# Patient Record
Sex: MALE | Race: WHITE | NOT HISPANIC OR LATINO | Employment: FULL TIME | URBAN - METROPOLITAN AREA
[De-identification: names, ages, dates, MRNs, and addresses within clinical notes are randomized per-mention and may not be internally consistent; named-entity substitution may affect disease eponyms.]

---

## 2013-03-13 LAB — HCV AB SER-ACNC: NEGATIVE

## 2021-09-22 PROBLEM — G43.909 MIGRAINE WITHOUT STATUS MIGRAINOSUS, NOT INTRACTABLE: Status: ACTIVE | Noted: 2021-09-22

## 2021-09-22 PROBLEM — J30.9 ALLERGIC RHINITIS: Status: ACTIVE | Noted: 2021-09-22

## 2022-11-07 ENCOUNTER — OFFICE VISIT (OUTPATIENT)
Dept: FAMILY MEDICINE | Facility: CLINIC | Age: 57
End: 2022-11-07
Payer: COMMERCIAL

## 2022-11-07 VITALS
HEIGHT: 72 IN | OXYGEN SATURATION: 98 % | BODY MASS INDEX: 34.95 KG/M2 | RESPIRATION RATE: 18 BRPM | HEART RATE: 72 BPM | WEIGHT: 258 LBS | SYSTOLIC BLOOD PRESSURE: 136 MMHG | DIASTOLIC BLOOD PRESSURE: 82 MMHG

## 2022-11-07 DIAGNOSIS — H40.9 GLAUCOMA OF BOTH EYES, UNSPECIFIED GLAUCOMA TYPE: ICD-10-CM

## 2022-11-07 DIAGNOSIS — D64.9 ANEMIA, UNSPECIFIED TYPE: ICD-10-CM

## 2022-11-07 DIAGNOSIS — E66.9 OBESITY (BMI 35.0-39.9 WITHOUT COMORBIDITY): ICD-10-CM

## 2022-11-07 DIAGNOSIS — E03.9 HYPOTHYROIDISM, UNSPECIFIED TYPE: ICD-10-CM

## 2022-11-07 DIAGNOSIS — G43.009 MIGRAINE WITHOUT AURA AND WITHOUT STATUS MIGRAINOSUS, NOT INTRACTABLE: Primary | ICD-10-CM

## 2022-11-07 DIAGNOSIS — E78.00 HYPERCHOLESTEROLEMIA: ICD-10-CM

## 2022-11-07 DIAGNOSIS — Z79.899 ENCOUNTER FOR LONG-TERM (CURRENT) USE OF OTHER MEDICATIONS: ICD-10-CM

## 2022-11-07 LAB
ALBUMIN SERPL BCP-MCNC: 3.7 G/DL (ref 3.5–5.2)
ALP SERPL-CCNC: 95 U/L (ref 55–135)
ALT SERPL W/O P-5'-P-CCNC: 20 U/L (ref 10–44)
ANION GAP SERPL CALC-SCNC: 11 MMOL/L (ref 8–16)
AST SERPL-CCNC: 18 U/L (ref 10–40)
BASOPHILS # BLD AUTO: 0.07 K/UL (ref 0–0.2)
BASOPHILS NFR BLD: 1.2 % (ref 0–1.9)
BILIRUB SERPL-MCNC: 0.2 MG/DL (ref 0.1–1)
BUN SERPL-MCNC: 13 MG/DL (ref 6–20)
CALCIUM SERPL-MCNC: 9.2 MG/DL (ref 8.7–10.5)
CHLORIDE SERPL-SCNC: 108 MMOL/L (ref 95–110)
CHOLEST SERPL-MCNC: 175 MG/DL (ref 120–199)
CHOLEST/HDLC SERPL: 4.3 {RATIO} (ref 2–5)
CO2 SERPL-SCNC: 21 MMOL/L (ref 23–29)
CREAT SERPL-MCNC: 1 MG/DL (ref 0.5–1.4)
DIFFERENTIAL METHOD: NORMAL
EOSINOPHIL # BLD AUTO: 0.3 K/UL (ref 0–0.5)
EOSINOPHIL NFR BLD: 4.8 % (ref 0–8)
ERYTHROCYTE [DISTWIDTH] IN BLOOD BY AUTOMATED COUNT: 13.1 % (ref 11.5–14.5)
EST. GFR  (NO RACE VARIABLE): >60 ML/MIN/1.73 M^2
GLUCOSE SERPL-MCNC: 110 MG/DL (ref 70–110)
HCT VFR BLD AUTO: 46.8 % (ref 40–54)
HDLC SERPL-MCNC: 41 MG/DL (ref 40–75)
HDLC SERPL: 23.4 % (ref 20–50)
HGB BLD-MCNC: 15 G/DL (ref 14–18)
IMM GRANULOCYTES # BLD AUTO: 0.01 K/UL (ref 0–0.04)
IMM GRANULOCYTES NFR BLD AUTO: 0.2 % (ref 0–0.5)
LDLC SERPL CALC-MCNC: 112.8 MG/DL (ref 63–159)
LYMPHOCYTES # BLD AUTO: 1.5 K/UL (ref 1–4.8)
LYMPHOCYTES NFR BLD: 25.3 % (ref 18–48)
MCH RBC QN AUTO: 30.4 PG (ref 27–31)
MCHC RBC AUTO-ENTMCNC: 32.1 G/DL (ref 32–36)
MCV RBC AUTO: 95 FL (ref 82–98)
MONOCYTES # BLD AUTO: 0.6 K/UL (ref 0.3–1)
MONOCYTES NFR BLD: 10.7 % (ref 4–15)
NEUTROPHILS # BLD AUTO: 3.4 K/UL (ref 1.8–7.7)
NEUTROPHILS NFR BLD: 57.8 % (ref 38–73)
NONHDLC SERPL-MCNC: 134 MG/DL
NRBC BLD-RTO: 0 /100 WBC
PLATELET # BLD AUTO: 204 K/UL (ref 150–450)
PMV BLD AUTO: 10.6 FL (ref 9.2–12.9)
POTASSIUM SERPL-SCNC: 4.1 MMOL/L (ref 3.5–5.1)
PROT SERPL-MCNC: 6.7 G/DL (ref 6–8.4)
RBC # BLD AUTO: 4.93 M/UL (ref 4.6–6.2)
SODIUM SERPL-SCNC: 140 MMOL/L (ref 136–145)
TRIGL SERPL-MCNC: 106 MG/DL (ref 30–150)
TSH SERPL DL<=0.005 MIU/L-ACNC: 1.68 UIU/ML (ref 0.4–4)
WBC # BLD AUTO: 5.81 K/UL (ref 3.9–12.7)

## 2022-11-07 PROCEDURE — 3075F PR MOST RECENT SYSTOLIC BLOOD PRESS GE 130-139MM HG: ICD-10-PCS | Mod: S$GLB,,, | Performed by: INTERNAL MEDICINE

## 2022-11-07 PROCEDURE — 1159F MED LIST DOCD IN RCRD: CPT | Mod: S$GLB,,, | Performed by: INTERNAL MEDICINE

## 2022-11-07 PROCEDURE — 85025 COMPLETE CBC W/AUTO DIFF WBC: CPT | Performed by: INTERNAL MEDICINE

## 2022-11-07 PROCEDURE — 3008F PR BODY MASS INDEX (BMI) DOCUMENTED: ICD-10-PCS | Mod: S$GLB,,, | Performed by: INTERNAL MEDICINE

## 2022-11-07 PROCEDURE — 3008F BODY MASS INDEX DOCD: CPT | Mod: S$GLB,,, | Performed by: INTERNAL MEDICINE

## 2022-11-07 PROCEDURE — 3079F PR MOST RECENT DIASTOLIC BLOOD PRESSURE 80-89 MM HG: ICD-10-PCS | Mod: S$GLB,,, | Performed by: INTERNAL MEDICINE

## 2022-11-07 PROCEDURE — 99204 OFFICE O/P NEW MOD 45 MIN: CPT | Mod: S$GLB,,, | Performed by: INTERNAL MEDICINE

## 2022-11-07 PROCEDURE — 3079F DIAST BP 80-89 MM HG: CPT | Mod: S$GLB,,, | Performed by: INTERNAL MEDICINE

## 2022-11-07 PROCEDURE — 3075F SYST BP GE 130 - 139MM HG: CPT | Mod: S$GLB,,, | Performed by: INTERNAL MEDICINE

## 2022-11-07 PROCEDURE — 80053 COMPREHEN METABOLIC PANEL: CPT | Performed by: INTERNAL MEDICINE

## 2022-11-07 PROCEDURE — 1159F PR MEDICATION LIST DOCUMENTED IN MEDICAL RECORD: ICD-10-PCS | Mod: S$GLB,,, | Performed by: INTERNAL MEDICINE

## 2022-11-07 PROCEDURE — 99204 PR OFFICE/OUTPT VISIT, NEW, LEVL IV, 45-59 MIN: ICD-10-PCS | Mod: S$GLB,,, | Performed by: INTERNAL MEDICINE

## 2022-11-07 PROCEDURE — 80061 LIPID PANEL: CPT | Performed by: INTERNAL MEDICINE

## 2022-11-07 PROCEDURE — 84443 ASSAY THYROID STIM HORMONE: CPT | Performed by: INTERNAL MEDICINE

## 2022-11-07 RX ORDER — RIMEGEPANT SULFATE 75 MG/75MG
TABLET, ORALLY DISINTEGRATING ORAL
Qty: 16 TABLET | Refills: 2 | Status: SHIPPED | OUTPATIENT
Start: 2022-11-07

## 2022-11-07 RX ORDER — PROMETHAZINE HYDROCHLORIDE 25 MG/1
TABLET ORAL
Qty: 15 TABLET | Refills: 1 | Status: SHIPPED | OUTPATIENT
Start: 2022-11-07 | End: 2022-12-22 | Stop reason: SDUPTHER

## 2022-11-07 RX ORDER — TRIAMCINOLONE ACETONIDE 0.25 MG/G
CREAM TOPICAL
COMMUNITY

## 2022-11-07 RX ORDER — DIPHENHYDRAMINE HCL 25 MG
25 TABLET ORAL NIGHTLY PRN
COMMUNITY

## 2022-11-07 RX ORDER — DIPHENOXYLATE HYDROCHLORIDE AND ATROPINE SULFATE 2.5; .025 MG/1; MG/1
TABLET ORAL
COMMUNITY

## 2022-11-07 NOTE — Clinical Note
Had Tdap vaccination in the last 4 years in [place], [place] at [facility]  Had hepatitis-C screening in [facility] by Dr. Lyndsey Campuzano  PCP

## 2022-11-07 NOTE — PROGRESS NOTES
Subjective:       Patient ID: Jason Swartz is a 56 y.o. male.    Chief Complaint: Establish Care    Patient presents to the office today to establish care.      With regards to the care gaps we have cover so far that he has had hepatitis-C screening when he was living in [facility] approximately 9 years ago and will get that record to update that care gap.  Regarding the Tdap his suffer an injury in the last 4 years on had a Tdap administered at [facility] in [place], I will get the clinical care coordinator to get that information to update this care gap.      So far he has not had a shingles vaccine on had a reaction to a flu shot so he will not be receiving 1.  Regarding the COVID vaccination is had 2 doses +1 booster as of now.    Main Reason 1st ablation care is the fact that he needs follow-up with regards to his migraine management.  I will refill his prescription of the Nurtec that he can take 1 every day as needed with a prescription for 16 with 2 refills to [facility] pharmacy.  The prescription for the promethazine will be issued to the local pharmacy so he can have it available in case he has severe migraine associated with nausea.    Last wellness examination was carried out September of 2021 so he is due 1 now and will have the lab work drawn today and arrange for the actual visit in the next 14 days as per routine.      Patient has been having his blood pressure checked at home with a digital apparatus and has been seen some elevation in the numbers.  He actually has a record at home.  In to bring his device to compare it against ours at the time of his wellness examination in the next 2 weeks.  As of today with our properly calibrated equipment and well trained personnel and proper size cuff is blood pressure 136/82 which we are not going to be hard pressed to make a diagnosis of hypertension based on that.      Because of a prior injury patient has been experiencing some neck pain  for which he used to take Botox injections.  On for some without have anyone the provides that can of service in town.  He has been resorting to the use of methocarbamol as before.  Will recommend against the use of traction at this time.        Review of Systems   All other systems reviewed and are negative.      Objective:      Physical Exam  Vitals and nursing note reviewed.   Constitutional:       General: He is not in acute distress.     Appearance: Normal appearance. He is obese. He is not ill-appearing, toxic-appearing or diaphoretic.   HENT:      Head: Normocephalic and atraumatic.   Cardiovascular:      Rate and Rhythm: Normal rate and regular rhythm.      Pulses: Normal pulses.      Heart sounds: Normal heart sounds. No murmur heard.  Pulmonary:      Effort: Pulmonary effort is normal.      Breath sounds: Normal breath sounds.   Skin:     General: Skin is warm and dry.      Coloration: Skin is not jaundiced or pale.   Neurological:      General: No focal deficit present.      Mental Status: He is alert and oriented to person, place, and time. Mental status is at baseline.      Cranial Nerves: No cranial nerve deficit.      Sensory: No sensory deficit.      Motor: No weakness.      Coordination: Coordination normal.      Gait: Gait normal.   Psychiatric:         Mood and Affect: Mood normal.         Behavior: Behavior normal.         Thought Content: Thought content normal.         Judgment: Judgment normal.       Assessment:       Problem List Items Addressed This Visit          Neuro    Migraine without status migrainosus, not intractable - Primary     Other Visit Diagnoses       Glaucoma of both eyes, unspecified glaucoma type        Anemia, unspecified type        Encounter for long-term (current) use of other medications        Hypothyroidism, unspecified type        Hypercholesterolemia        Obesity (BMI 35.0-39.9 without comorbidity)                  Plan:       Patient returns today to get  re-established under my care.      Care gaps will be addressed more in detail at the time of the wellness exam which will take place in the next 2 weeks.      Wellness examination lab work has been obtained today and patient is in a fasting state.  This included CBC, comprehensive metabolic panel, lipid panel, TSH and hemoglobin A1c    He prefers not to have the seasonal influenza vaccination being that he had a localized reaction before and that will be included on the allergies starting today.      I have requested for the clinical care coordinator to obtain information regarding his Tdap vaccination history and hepatitis-C screening.    He can have a shingles vaccination at his convenience.      He has had 2 doses of the COVID vaccine +1 booster.      Prescription for the Nurtec 1 every day as needed 16. With 2 refills will be issued to the mail order pharmacy and will contact him with regards to establishing a relationship.    Referral to neurology has been issued to Dr. Minor and will get a call from his office with regards to final scheduling details.

## 2022-11-08 ENCOUNTER — PATIENT MESSAGE (OUTPATIENT)
Dept: ADMINISTRATIVE | Facility: HOSPITAL | Age: 57
End: 2022-11-08
Payer: COMMERCIAL

## 2022-11-08 ENCOUNTER — PATIENT OUTREACH (OUTPATIENT)
Dept: ADMINISTRATIVE | Facility: HOSPITAL | Age: 57
End: 2022-11-08
Payer: COMMERCIAL

## 2022-11-08 NOTE — PROGRESS NOTES
PORTAL MESSAGE SENT TO PATIENT TO FIND OUT WHERE TDAP AND COLONOSCOPY (IF DONE) WERE DONE.  HEP C HYPERLINKED FROM CARE EVERYWHERE

## 2022-12-20 ENCOUNTER — OFFICE VISIT (OUTPATIENT)
Dept: URGENT CARE | Facility: CLINIC | Age: 57
End: 2022-12-20
Payer: COMMERCIAL

## 2022-12-20 VITALS
WEIGHT: 258 LBS | TEMPERATURE: 99 F | DIASTOLIC BLOOD PRESSURE: 93 MMHG | BODY MASS INDEX: 34.99 KG/M2 | SYSTOLIC BLOOD PRESSURE: 142 MMHG | OXYGEN SATURATION: 97 % | HEART RATE: 85 BPM | RESPIRATION RATE: 16 BRPM

## 2022-12-20 DIAGNOSIS — G43.901 MIGRAINE WITH STATUS MIGRAINOSUS, NOT INTRACTABLE, UNSPECIFIED MIGRAINE TYPE: Primary | ICD-10-CM

## 2022-12-20 DIAGNOSIS — R21 RASH AND NONSPECIFIC SKIN ERUPTION: ICD-10-CM

## 2022-12-20 DIAGNOSIS — B02.8 HERPES ZOSTER WITH COMPLICATION: ICD-10-CM

## 2022-12-20 PROCEDURE — 1159F MED LIST DOCD IN RCRD: CPT | Mod: S$GLB,,, | Performed by: STUDENT IN AN ORGANIZED HEALTH CARE EDUCATION/TRAINING PROGRAM

## 2022-12-20 PROCEDURE — 3008F PR BODY MASS INDEX (BMI) DOCUMENTED: ICD-10-PCS | Mod: S$GLB,,, | Performed by: STUDENT IN AN ORGANIZED HEALTH CARE EDUCATION/TRAINING PROGRAM

## 2022-12-20 PROCEDURE — 96372 PR INJECTION,THERAP/PROPH/DIAG2ST, IM OR SUBCUT: ICD-10-PCS | Mod: S$GLB,,, | Performed by: STUDENT IN AN ORGANIZED HEALTH CARE EDUCATION/TRAINING PROGRAM

## 2022-12-20 PROCEDURE — 96372 THER/PROPH/DIAG INJ SC/IM: CPT | Mod: S$GLB,,, | Performed by: STUDENT IN AN ORGANIZED HEALTH CARE EDUCATION/TRAINING PROGRAM

## 2022-12-20 PROCEDURE — 1159F PR MEDICATION LIST DOCUMENTED IN MEDICAL RECORD: ICD-10-PCS | Mod: S$GLB,,, | Performed by: STUDENT IN AN ORGANIZED HEALTH CARE EDUCATION/TRAINING PROGRAM

## 2022-12-20 PROCEDURE — 3077F SYST BP >= 140 MM HG: CPT | Mod: S$GLB,,, | Performed by: STUDENT IN AN ORGANIZED HEALTH CARE EDUCATION/TRAINING PROGRAM

## 2022-12-20 PROCEDURE — 99204 OFFICE O/P NEW MOD 45 MIN: CPT | Mod: 25,S$GLB,, | Performed by: STUDENT IN AN ORGANIZED HEALTH CARE EDUCATION/TRAINING PROGRAM

## 2022-12-20 PROCEDURE — 99204 PR OFFICE/OUTPT VISIT, NEW, LEVL IV, 45-59 MIN: ICD-10-PCS | Mod: 25,S$GLB,, | Performed by: STUDENT IN AN ORGANIZED HEALTH CARE EDUCATION/TRAINING PROGRAM

## 2022-12-20 PROCEDURE — 1160F RVW MEDS BY RX/DR IN RCRD: CPT | Mod: S$GLB,,, | Performed by: STUDENT IN AN ORGANIZED HEALTH CARE EDUCATION/TRAINING PROGRAM

## 2022-12-20 PROCEDURE — 3080F DIAST BP >= 90 MM HG: CPT | Mod: S$GLB,,, | Performed by: STUDENT IN AN ORGANIZED HEALTH CARE EDUCATION/TRAINING PROGRAM

## 2022-12-20 PROCEDURE — 1160F PR REVIEW ALL MEDS BY PRESCRIBER/CLIN PHARMACIST DOCUMENTED: ICD-10-PCS | Mod: S$GLB,,, | Performed by: STUDENT IN AN ORGANIZED HEALTH CARE EDUCATION/TRAINING PROGRAM

## 2022-12-20 PROCEDURE — 3080F PR MOST RECENT DIASTOLIC BLOOD PRESSURE >= 90 MM HG: ICD-10-PCS | Mod: S$GLB,,, | Performed by: STUDENT IN AN ORGANIZED HEALTH CARE EDUCATION/TRAINING PROGRAM

## 2022-12-20 PROCEDURE — 3008F BODY MASS INDEX DOCD: CPT | Mod: S$GLB,,, | Performed by: STUDENT IN AN ORGANIZED HEALTH CARE EDUCATION/TRAINING PROGRAM

## 2022-12-20 PROCEDURE — 3077F PR MOST RECENT SYSTOLIC BLOOD PRESSURE >= 140 MM HG: ICD-10-PCS | Mod: S$GLB,,, | Performed by: STUDENT IN AN ORGANIZED HEALTH CARE EDUCATION/TRAINING PROGRAM

## 2022-12-20 RX ORDER — BUTALBITAL, ACETAMINOPHEN AND CAFFEINE 50; 325; 40 MG/1; MG/1; MG/1
1 TABLET ORAL EVERY 6 HOURS PRN
Qty: 15 TABLET | Refills: 0 | Status: SHIPPED | OUTPATIENT
Start: 2022-12-20

## 2022-12-20 RX ORDER — VALACYCLOVIR HYDROCHLORIDE 1 G/1
1000 TABLET, FILM COATED ORAL 3 TIMES DAILY
Qty: 21 TABLET | Refills: 0 | Status: SHIPPED | OUTPATIENT
Start: 2022-12-20 | End: 2022-12-27

## 2022-12-20 RX ORDER — KETOROLAC TROMETHAMINE 30 MG/ML
60 INJECTION, SOLUTION INTRAMUSCULAR; INTRAVENOUS
Status: DISCONTINUED | OUTPATIENT
Start: 2022-12-20 | End: 2022-12-20

## 2022-12-20 RX ORDER — DIPHENHYDRAMINE HYDROCHLORIDE 50 MG/ML
12.5 INJECTION INTRAMUSCULAR; INTRAVENOUS
Status: DISCONTINUED | OUTPATIENT
Start: 2022-12-20 | End: 2022-12-20

## 2022-12-20 RX ORDER — PROMETHAZINE HYDROCHLORIDE 25 MG/ML
25 INJECTION, SOLUTION INTRAMUSCULAR; INTRAVENOUS
Status: DISCONTINUED | OUTPATIENT
Start: 2022-12-20 | End: 2022-12-20

## 2022-12-20 RX ORDER — CEPHALEXIN 500 MG/1
500 CAPSULE ORAL EVERY 8 HOURS
Qty: 30 CAPSULE | Refills: 0 | Status: SHIPPED | OUTPATIENT
Start: 2022-12-20 | End: 2022-12-30

## 2022-12-20 RX ADMIN — PROMETHAZINE HYDROCHLORIDE 25 MG: 25 INJECTION, SOLUTION INTRAMUSCULAR; INTRAVENOUS at 04:12

## 2022-12-20 RX ADMIN — KETOROLAC TROMETHAMINE 60 MG: 30 INJECTION, SOLUTION INTRAMUSCULAR; INTRAVENOUS at 04:12

## 2022-12-20 RX ADMIN — DIPHENHYDRAMINE HYDROCHLORIDE 12.5 MG: 50 INJECTION INTRAMUSCULAR; INTRAVENOUS at 04:12

## 2022-12-20 NOTE — PROGRESS NOTES
oaSubjective:       Patient ID: Jason Swartz is a 57 y.o. male.    Vitals:  weight is 117 kg (258 lb). His oral temperature is 99 °F (37.2 °C). His blood pressure is 142/93 (abnormal) and his pulse is 85. His respiration is 16 and oxygen saturation is 97%.     Chief Complaint: Headache and Blister    Patient is a 57-year-old male with a past medical history of ADHD, skin cancer, pre glaucoma, and migraines who presents to clinic for evaluation of migraine and for rash.  Patient reports migraine times 9 days.  Patient states he takes prescribed Nurtec and sees Neurology for his headaches.  Patient states 90% of time his migraines are on the left side of his head however 10% on the right side of his head.  Patient states current episode is located to the right side of his head.  Patient describes pain to be throbbing type sensation.  Patient states symptoms feel identical to prior migraines.  Patient states that he has experienced associated symptoms of photophobia.  Patient states that he has also taking Phenergan to help him sleep.  Patient states no resolution of symptoms.  Patient states he was once on a medications something similar to Fioricet which did helps symptoms however he has been out for the past few months.  Patient states that he has an appointment with Neurology in a few weeks.  Patient states that he is also experienced a rash to the right side of the face.  Patient states it is along the nose on the inside portion of his right eye.  Patient states symptoms have not crossing to his eye however are close.  Patient states prior to rash she experienced an itching and burning type sensation.  Patient states he then experienced a blistering rash to the face.  Patient states that he tried to squeeze the area and drained the blisters however pain was increased.  Patient states that he has not used any over-the-counter creams for this at this point.  Patient states that he has not experienced any  dizziness, fever or chills, vision loss or change, inner eye complications, chest pain or palpitations, shortness of breath or cough, abdominal pain, nausea vomiting, diarrhea, or change in mentation.    Headache   This is a new problem. The current episode started in the past 7 days (sunday before last). The problem occurs constantly. The problem has been unchanged. The pain is located in the Temporal region. The pain quality is similar to prior headaches. The quality of the pain is described as aching, throbbing, stabbing and sharp. The pain is at a severity of 10/10. Associated symptoms include photophobia and a visual change. Pertinent negatives include no abdominal pain, blurred vision, coughing, dizziness, ear pain, eye pain, eye redness, fever, nausea, sore throat or vomiting. The treatment provided no relief. His past medical history is significant for migraine headaches.     Constitution: Negative. Negative for chills, sweating, fatigue and fever.   HENT:  Negative for ear pain, congestion and sore throat.    Neck: neck negative.   Cardiovascular: Negative.  Negative for chest pain and palpitations.   Eyes:  Positive for photophobia. Negative for eye discharge, eye itching, eye pain, eye redness, vision loss, double vision, blurred vision and eyelid swelling.   Respiratory:  Negative for chest tightness, cough and shortness of breath.    Gastrointestinal: Negative.  Negative for abdominal pain, nausea, vomiting and diarrhea.   Endocrine: negative.   Genitourinary: Negative.    Musculoskeletal: Negative.    Skin:  Positive for rash and erythema (Associated with rash).   Allergic/Immunologic: Positive for itching.   Neurological:  Positive for headaches and history of migraines. Negative for dizziness, light-headedness, passing out, disorientation and altered mental status.   Hematologic/Lymphatic: Negative.    Psychiatric/Behavioral: Negative.  Negative for altered mental status, disorientation and  confusion.      Objective:      Physical Exam   Constitutional: He is oriented to person, place, and time. He appears well-developed. He is cooperative.  Non-toxic appearance. He does not appear ill. No distress.   HENT:   Head: Normocephalic and atraumatic.       Ears:   Right Ear: Hearing, tympanic membrane, external ear and ear canal normal.   Left Ear: Hearing, tympanic membrane, external ear and ear canal normal.   Nose: Nose normal. No mucosal edema, rhinorrhea or nasal deformity. No epistaxis. Right sinus exhibits no maxillary sinus tenderness and no frontal sinus tenderness. Left sinus exhibits no maxillary sinus tenderness and no frontal sinus tenderness.   Mouth/Throat: Uvula is midline, oropharynx is clear and moist and mucous membranes are normal. No trismus in the jaw. Normal dentition. No uvula swelling. No posterior oropharyngeal erythema.   Eyes: Conjunctivae and lids are normal. Pupils are equal, round, and reactive to light. Right eye exhibits no discharge. Left eye exhibits no discharge. No scleral icterus.   Neck: Trachea normal and phonation normal. Neck supple. No neck rigidity present.   Cardiovascular: Normal rate, regular rhythm, normal heart sounds and normal pulses.   Pulmonary/Chest: Effort normal and breath sounds normal. No respiratory distress. He has no wheezes. He has no rhonchi. He has no rales.   Abdominal: Normal appearance and bowel sounds are normal. He exhibits no distension. Soft. There is no abdominal tenderness.   Musculoskeletal: Normal range of motion.         General: No deformity. Normal range of motion.      Cervical back: He exhibits no tenderness.   Lymphadenopathy:     He has no cervical adenopathy.   Neurological: He is alert and oriented to person, place, and time. He displays no weakness. No sensory deficit. He exhibits normal muscle tone. Coordination and gait normal.   Skin: Skin is warm, dry, intact, not diaphoretic, not pale, rash and vesicular. Capillary  refill takes 2 to 3 seconds. erythema (Associated with rash)   Psychiatric: His speech is normal and behavior is normal. Judgment and thought content normal.   Nursing note and vitals reviewed.      Assessment:       1. Migraine with status migrainosus, not intractable, unspecified migraine type    2. Herpes zoster with complication    3. Rash and nonspecific skin eruption          Plan:         Migraine with status migrainosus, not intractable, unspecified migraine type    Herpes zoster with complication    Rash and nonspecific skin eruption    Other orders  -     valACYclovir (VALTREX) 1000 MG tablet; Take 1 tablet (1,000 mg total) by mouth 3 (three) times daily. for 7 days  Dispense: 21 tablet; Refill: 0  -     cephALEXin (KEFLEX) 500 MG capsule; Take 1 capsule (500 mg total) by mouth every 8 (eight) hours. for 10 days  Dispense: 30 capsule; Refill: 0  -     butalbital-acetaminophen-caffeine -40 mg (FIORICET, ESGIC) -40 mg per tablet; Take 1 tablet by mouth every 6 (six) hours as needed for Headaches.  Dispense: 15 tablet; Refill: 0  -     ketorolac injection 60 mg  -     promethazine injection 25 mg  -     diphenhydrAMINE injection 12.5 mg                 Toradol 60 mg IM, Phenergan 25 mg IM, and Benadryl 12.5 mg IM in clinic.  Patient tolerated well.  No complications noted.  Patient tolerated well.    Patient advised to go home and sleep in a dark cold room.  Patient has friend here to drive him home.  Take medications as prescribed.     checked through epic.    Tylenol/Motrin per package instructions for any pain or fever.    Follow-up PCP in 1-2 days.    Follow-up with neurologist as previously scheduled.    Return to clinic as needed.    To ED for any continued, new, or acutely worsening symptoms.    Patient also advised to follow with ophthalmology if symptoms continue or worsen.  Patient in agreement plan of care.    DISCLAIMER: Please note that my documentation in this Electronic  Healthcare Record was produced using speech recognition software and therefore may contain errors related to that software system.These could include grammar, punctuation and spelling errors or the inclusion/exclusion of phrases that were not intended. Garbled syntax, mangled pronouns, and other bizarre constructions may be attributed to that software system.

## 2022-12-21 ENCOUNTER — OFFICE VISIT (OUTPATIENT)
Dept: FAMILY MEDICINE | Facility: CLINIC | Age: 57
End: 2022-12-21
Payer: COMMERCIAL

## 2022-12-21 VITALS
RESPIRATION RATE: 18 BRPM | TEMPERATURE: 98 F | DIASTOLIC BLOOD PRESSURE: 84 MMHG | BODY MASS INDEX: 35.83 KG/M2 | OXYGEN SATURATION: 94 % | HEART RATE: 91 BPM | SYSTOLIC BLOOD PRESSURE: 126 MMHG | HEIGHT: 72 IN | WEIGHT: 264.56 LBS

## 2022-12-21 DIAGNOSIS — G43.009 MIGRAINE WITHOUT AURA AND WITHOUT STATUS MIGRAINOSUS, NOT INTRACTABLE: Primary | ICD-10-CM

## 2022-12-21 DIAGNOSIS — B02.33 HERPES ZOSTER KERATOCONJUNCTIVITIS: ICD-10-CM

## 2022-12-21 PROCEDURE — 1159F MED LIST DOCD IN RCRD: CPT | Mod: S$GLB,,, | Performed by: INTERNAL MEDICINE

## 2022-12-21 PROCEDURE — 3074F PR MOST RECENT SYSTOLIC BLOOD PRESSURE < 130 MM HG: ICD-10-PCS | Mod: S$GLB,,, | Performed by: INTERNAL MEDICINE

## 2022-12-21 PROCEDURE — 3008F PR BODY MASS INDEX (BMI) DOCUMENTED: ICD-10-PCS | Mod: S$GLB,,, | Performed by: INTERNAL MEDICINE

## 2022-12-21 PROCEDURE — 3079F PR MOST RECENT DIASTOLIC BLOOD PRESSURE 80-89 MM HG: ICD-10-PCS | Mod: S$GLB,,, | Performed by: INTERNAL MEDICINE

## 2022-12-21 PROCEDURE — 3008F BODY MASS INDEX DOCD: CPT | Mod: S$GLB,,, | Performed by: INTERNAL MEDICINE

## 2022-12-21 PROCEDURE — 3079F DIAST BP 80-89 MM HG: CPT | Mod: S$GLB,,, | Performed by: INTERNAL MEDICINE

## 2022-12-21 PROCEDURE — 99213 PR OFFICE/OUTPT VISIT, EST, LEVL III, 20-29 MIN: ICD-10-PCS | Mod: S$GLB,,, | Performed by: INTERNAL MEDICINE

## 2022-12-21 PROCEDURE — 99213 OFFICE O/P EST LOW 20 MIN: CPT | Mod: S$GLB,,, | Performed by: INTERNAL MEDICINE

## 2022-12-21 PROCEDURE — 3074F SYST BP LT 130 MM HG: CPT | Mod: S$GLB,,, | Performed by: INTERNAL MEDICINE

## 2022-12-21 PROCEDURE — 1159F PR MEDICATION LIST DOCUMENTED IN MEDICAL RECORD: ICD-10-PCS | Mod: S$GLB,,, | Performed by: INTERNAL MEDICINE

## 2022-12-21 RX ORDER — HYDROCODONE BITARTRATE AND ACETAMINOPHEN 5; 325 MG/1; MG/1
1 TABLET ORAL EVERY 6 HOURS PRN
Qty: 30 TABLET | Refills: 0 | Status: SHIPPED | OUTPATIENT
Start: 2022-12-21

## 2022-12-21 NOTE — PROGRESS NOTES
Subjective:       Patient ID: Jason Swartz is a 57 y.o. male.    Chief Complaint: Follow-up (Pt presents to the clinic for shingles. PT went to urgent care yesterday and was prescribed keflex and valtrex . )    Presents for further evaluation and therapy of an acute episode and 1st episode ever of shingles.  It is affecting his right face from the temporal and forehead area to the nasal area and right eye.  He was also having some symptoms in his.  He was seen at a local urgent care where he was started on cephalexin on Valtrex.  At this time the most pressing issue is the fact that it is affecting his eye and he will be seen now this morning by the optometrist next door after I have already arranged for that this morning.      Pain management will be achieved by prescribing Norco 5 mg 1 by mouth every 6 hours as needed number 30 with no refills to be issued to the [facility] at patient's request.      Local care to the area has already been discussed.      Shingles vaccination will be administered after he has no symptoms on this acute attack.      Regarding his migraine headaches there are improved which is what he thought when the pain started without the rash as the reason for his symptomatology.  It definitely just confuse the picture more.      Lab work from November 7, 2022 was reviewed again by me now.      Vaccines at other care gaps are to be addressed at the next appointment.  Patient is leaving the office now at 9:10 a.m. in the morning to be seen by the optometrist.    Review of Systems   All other systems reviewed and are negative.      Objective:      Physical Exam  Vitals and nursing note reviewed.   Constitutional:       General: He is not in acute distress.     Appearance: Normal appearance. He is obese. He is ill-appearing. He is not toxic-appearing or diaphoretic.   HENT:      Head: Atraumatic.      Nose:      Comments: Shingles rash on the right side of the nose from the  base of the nose almost to the tip.  Some involvement of the right infraorbital area.  Eyes:      General: Scleral icterus: ..         Right eye: Discharge present.      Comments: Acute shingles of the right eye    Neurological:      Mental Status: He is alert. Mental status is at baseline.       Assessment:       Problem List Items Addressed This Visit          Neuro    Migraine without status migrainosus, not intractable - Primary     Other Visit Diagnoses       Herpes zoster keratoconjunctivitis                  Plan:       Patient is already receiving oral cephalosporin and antiviral in the form of valacyclovir as per urgent care and is now being referred to Optometry for further evaluation and therapy of these ocular involvement by shingles.    Follow-up appointment has already been arranged regarding his prior lab work and general medical care    Patient has already been referred to Neurology regarding further management of his migraine.    Is to receive the Shingrix vaccination when he has no more symptoms of this acute shingles attack.

## 2022-12-22 ENCOUNTER — TELEPHONE (OUTPATIENT)
Dept: FAMILY MEDICINE | Facility: CLINIC | Age: 57
End: 2022-12-22
Payer: COMMERCIAL

## 2022-12-22 DIAGNOSIS — G43.009 MIGRAINE WITHOUT AURA AND WITHOUT STATUS MIGRAINOSUS, NOT INTRACTABLE: ICD-10-CM

## 2022-12-22 RX ORDER — PROMETHAZINE HYDROCHLORIDE 25 MG/1
TABLET ORAL
Qty: 15 TABLET | Refills: 1 | Status: SHIPPED | OUTPATIENT
Start: 2022-12-22 | End: 2023-03-22 | Stop reason: SDUPTHER

## 2022-12-22 NOTE — TELEPHONE ENCOUNTER
Dr Crespo has sent a prescription for Phenergan to [facility] today.  ----- Message from Venita Dang sent at 12/22/2022 12:59 PM CST -----  Contact: patient  Type:  RX Refill Request    Who Called:  patient  Refill or New Rx:  refill  RX Name and Strength:  promethazine (PHENERGAN) 25 MG tablet  How is the patient currently taking it? (ex. 1XDay):  as directed  Is this a 30 day or 90 day RX:  30  Preferred Pharmacy with phone number:    [id number] - [place], [place] - [address] [facility] [address]  [address]  [facility] [address]  Phone: [phone] Fax: [phone]  Local or Mail Order:  Local  Ordering Provider:  jimena  Best Call Back Number:  [phone]  Additional Information:

## 2022-12-26 ENCOUNTER — DOCUMENTATION ONLY (OUTPATIENT)
Dept: FAMILY MEDICINE | Facility: CLINIC | Age: 57
End: 2022-12-26
Payer: COMMERCIAL

## 2022-12-27 ENCOUNTER — OFFICE VISIT (OUTPATIENT)
Dept: FAMILY MEDICINE | Facility: CLINIC | Age: 57
End: 2022-12-27
Payer: COMMERCIAL

## 2022-12-27 VITALS
WEIGHT: 263 LBS | HEART RATE: 88 BPM | HEIGHT: 72 IN | OXYGEN SATURATION: 98 % | BODY MASS INDEX: 35.62 KG/M2 | DIASTOLIC BLOOD PRESSURE: 82 MMHG | SYSTOLIC BLOOD PRESSURE: 118 MMHG

## 2022-12-27 DIAGNOSIS — R73.02 IGT (IMPAIRED GLUCOSE TOLERANCE): ICD-10-CM

## 2022-12-27 DIAGNOSIS — G43.009 MIGRAINE WITHOUT AURA AND WITHOUT STATUS MIGRAINOSUS, NOT INTRACTABLE: ICD-10-CM

## 2022-12-27 DIAGNOSIS — B02.33 HERPES ZOSTER KERATOCONJUNCTIVITIS: Primary | ICD-10-CM

## 2022-12-27 DIAGNOSIS — E66.9 OBESITY (BMI 35.0-39.9 WITHOUT COMORBIDITY): ICD-10-CM

## 2022-12-27 DIAGNOSIS — H40.9 GLAUCOMA OF BOTH EYES, UNSPECIFIED GLAUCOMA TYPE: ICD-10-CM

## 2022-12-27 LAB
ESTIMATED AVG GLUCOSE: 120 MG/DL (ref 68–131)
HBA1C MFR BLD: 5.8 % (ref 4–5.6)

## 2022-12-27 PROCEDURE — 3079F PR MOST RECENT DIASTOLIC BLOOD PRESSURE 80-89 MM HG: ICD-10-PCS | Mod: S$GLB,,, | Performed by: INTERNAL MEDICINE

## 2022-12-27 PROCEDURE — 83036 HEMOGLOBIN GLYCOSYLATED A1C: CPT | Performed by: INTERNAL MEDICINE

## 2022-12-27 PROCEDURE — 99213 OFFICE O/P EST LOW 20 MIN: CPT | Mod: S$GLB,,, | Performed by: INTERNAL MEDICINE

## 2022-12-27 PROCEDURE — 1159F MED LIST DOCD IN RCRD: CPT | Mod: S$GLB,,, | Performed by: INTERNAL MEDICINE

## 2022-12-27 PROCEDURE — 3074F PR MOST RECENT SYSTOLIC BLOOD PRESSURE < 130 MM HG: ICD-10-PCS | Mod: S$GLB,,, | Performed by: INTERNAL MEDICINE

## 2022-12-27 PROCEDURE — 3008F BODY MASS INDEX DOCD: CPT | Mod: S$GLB,,, | Performed by: INTERNAL MEDICINE

## 2022-12-27 PROCEDURE — 3074F SYST BP LT 130 MM HG: CPT | Mod: S$GLB,,, | Performed by: INTERNAL MEDICINE

## 2022-12-27 PROCEDURE — 99213 PR OFFICE/OUTPT VISIT, EST, LEVL III, 20-29 MIN: ICD-10-PCS | Mod: S$GLB,,, | Performed by: INTERNAL MEDICINE

## 2022-12-27 PROCEDURE — 3079F DIAST BP 80-89 MM HG: CPT | Mod: S$GLB,,, | Performed by: INTERNAL MEDICINE

## 2022-12-27 PROCEDURE — 1159F PR MEDICATION LIST DOCUMENTED IN MEDICAL RECORD: ICD-10-PCS | Mod: S$GLB,,, | Performed by: INTERNAL MEDICINE

## 2022-12-27 PROCEDURE — 3008F PR BODY MASS INDEX (BMI) DOCUMENTED: ICD-10-PCS | Mod: S$GLB,,, | Performed by: INTERNAL MEDICINE

## 2022-12-27 NOTE — PROGRESS NOTES
Subjective:       Patient ID: Jason Swartz is a 57 y.o. male.    Chief Complaint: Follow-up, Herpes Zoster (Pt reports he is present today to f/u on status of shingles near R eye. Pt x 1 week since diagnosis and reports doing a lot better. Pt did have a potential reaction to shingles medication x 2-3 nights ago that he reports experiencing chest tightness and vomiting. EMT was called and came out to pt house an assessed him. Pt went in to ER to be evaluated. Pt was ok and told to continue medication.), and Fall (Pt fell x 1 week ago while walking. Pt reports R rib pain and tenderness. )    Patient presents today for follow-up of herpes zoster affecting the right ophthalmic distribution.  Pain is improved.  Vision is also.  Follow-up appointment with optometrist tomorrow.  Patient has 2 days left of antibiotics and Valtrex therapy.      Vital signs are stable with blood pressure 118/82.    Patient's suffer fall when he slipped as he was walking outside to check on his pipe and he suffered trauma to the right ribcage.  The area is not ecchymotic.  Is a pain of about a 7-8 on a scale of 1-10 when he comes to the times that he tries to blow his nose for fully or cough.  Patient does not have any symptomatology suggestive for a pneumothorax related to same so no x-rays have been done nor 1 is planned.      He was seen in the emergency room because of an episode of chest tightness and vomiting and the workup there was negative for acute ischemic cardiac issues.  Symptomatology has now abated.  He did have a good response to the use of promethazine for the control of the emesis.      Review of the lab work performed [facility] showed a glucose of 151 which is significantly higher than what he was November 7 when he had his wellness lab work.  Will take advantage of the fact is here today and hemoglobin A1c will be performed now.  Will have the report for this afternoon and  according to the values the patient will be contacted whether he needs to return and be started on therapy or not.  He also has access to that information to his portal.    Regarding medication review patient does have a prescription for Adderall that he takes sparingly.  The medications relatively contraindicated because of his history of glaucoma.      At the time of the eye examination because of the ophthalmic zoster he was told to resume his therapy with come began for the intra-ocular pressure abnormalities and they will check it again tomorrow.        Review of Systems   All other systems reviewed and are negative.      Objective:      Physical Exam  Vitals and nursing note reviewed.   Constitutional:       General: He is not in acute distress.     Appearance: Normal appearance. He is obese. He is not ill-appearing, toxic-appearing or diaphoretic.   HENT:      Head: Normocephalic and atraumatic.   Cardiovascular:      Rate and Rhythm: Normal rate and regular rhythm.      Pulses: Normal pulses.      Heart sounds: Normal heart sounds. No murmur heard.  Pulmonary:      Effort: Pulmonary effort is normal. No respiratory distress.      Breath sounds: No wheezing or rhonchi.   Skin:     General: Skin is warm and dry.      Capillary Refill: Capillary refill takes less than 2 seconds.      Coloration: Skin is not jaundiced or pale.   Neurological:      General: No focal deficit present.      Mental Status: He is alert and oriented to person, place, and time. Mental status is at baseline.      Cranial Nerves: No cranial nerve deficit.      Sensory: No sensory deficit.      Motor: No weakness.      Coordination: Coordination normal.      Gait: Gait normal.   Psychiatric:         Mood and Affect: Mood normal.         Behavior: Behavior normal.         Thought Content: Thought content normal.         Judgment: Judgment normal.       Assessment:       Problem List Items Addressed This Visit          Neuro    Migraine  without status migrainosus, not intractable     Other Visit Diagnoses       Herpes zoster keratoconjunctivitis    -  Primary    IGT (impaired glucose tolerance)        Obesity (BMI 35.0-39.9 without comorbidity)        Glaucoma of both eyes, unspecified glaucoma type                Plan:       Patient is recovering from his herpes zoster keratoconjunctivitis.  As of now it appears that he will not be developing post herpetic neuralgia judging by the fact that he is pain is improving.  He is compliant and almost completing the therapy with the Valtrex.      Follow-up optometry exam is tomorrow regarding this keratoconjunctivitis and also for follow-up of intra-ocular pressure abnormalities.      Because of the elevation of glucose at the time of the emergency room visit without any steroid therapy etc. a hemoglobin A1c will be drawn here today will have the report this afternoon I will contact him accordingly.      Regarding the chest trauma without any signs of potential pneumothorax or significant broken ribs there is no reason to pursue this any further.  Patient is aware of the signs and symptoms to watch for and he is to seek medical attention in case these appear

## 2022-12-27 NOTE — PROGRESS NOTES
Prior Authorization approved by [facility] for Hydrocodone 5/325 12/23/22. Approval scanned into Media.

## 2023-01-05 ENCOUNTER — TELEPHONE (OUTPATIENT)
Dept: FAMILY MEDICINE | Facility: CLINIC | Age: 58
End: 2023-01-05
Payer: COMMERCIAL

## 2023-01-05 NOTE — TELEPHONE ENCOUNTER
----- Message from Mary Gomez sent at 1/5/2023 11:24 AM CST -----  Contact: [facility]  Refill    Who Called: [facility]   Refill or New Rx: refill  RX Name and Strength:     NURTEC 75 mg odt 16 tablet 2 11/7/2022  Yes  Sig: Take 1 tablet by mouth once a day as needed for migraine      How is the patient currently taking it? (ex. 1XDay): as order  Is this a 30 day or 90 day RX:   Preferred Pharmacy with phone number:     [facility] (New Address) [address] AT Previously: [address], [place]   Phone:  [phone]  Fax:  [phone]        Local or Mail Order: local   Ordering Provider: rosenda [place] Call Back Number: [phone]  Additional Information:

## 2023-01-10 ENCOUNTER — DOCUMENTATION ONLY (OUTPATIENT)
Dept: FAMILY MEDICINE | Facility: CLINIC | Age: 58
End: 2023-01-10
Payer: COMMERCIAL

## 2023-01-10 NOTE — PROGRESS NOTES
Patient brought form in from [employer] [facility] where he is applying for insurance. The form and copies of recent labs have been faxed to [phone] per the patient's request and form instructs.

## 2023-03-20 ENCOUNTER — OFFICE VISIT (OUTPATIENT)
Dept: URGENT CARE | Facility: CLINIC | Age: 58
End: 2023-03-20
Payer: COMMERCIAL

## 2023-03-20 VITALS
SYSTOLIC BLOOD PRESSURE: 139 MMHG | TEMPERATURE: 98 F | BODY MASS INDEX: 35.67 KG/M2 | RESPIRATION RATE: 16 BRPM | OXYGEN SATURATION: 96 % | HEART RATE: 92 BPM | WEIGHT: 263 LBS | DIASTOLIC BLOOD PRESSURE: 95 MMHG

## 2023-03-20 DIAGNOSIS — S63.501A RIGHT WRIST SPRAIN, INITIAL ENCOUNTER: ICD-10-CM

## 2023-03-20 DIAGNOSIS — S69.91XA INJURY OF RIGHT WRIST, INITIAL ENCOUNTER: Primary | ICD-10-CM

## 2023-03-20 PROCEDURE — 99214 PR OFFICE/OUTPT VISIT, EST, LEVL IV, 30-39 MIN: ICD-10-PCS | Mod: S$GLB,,, | Performed by: STUDENT IN AN ORGANIZED HEALTH CARE EDUCATION/TRAINING PROGRAM

## 2023-03-20 PROCEDURE — 99214 OFFICE O/P EST MOD 30 MIN: CPT | Mod: S$GLB,,, | Performed by: STUDENT IN AN ORGANIZED HEALTH CARE EDUCATION/TRAINING PROGRAM

## 2023-03-20 RX ORDER — KETOROLAC TROMETHAMINE 10 MG/1
10 TABLET, FILM COATED ORAL EVERY 6 HOURS
Qty: 20 TABLET | Refills: 0 | Status: SHIPPED | OUTPATIENT
Start: 2023-03-20 | End: 2023-04-01

## 2023-03-20 NOTE — PROGRESS NOTES
Subjective:       Patient ID: Jason Swartz is a 57 y.o. male.    Vitals:  weight is 119.3 kg (263 lb). His oral temperature is 98 °F (36.7 °C). His blood pressure is 139/95 (abnormal) and his pulse is 92. His respiration is 16 and oxygen saturation is 96%.     Chief Complaint: Fall (Landed on Rt hand/wrist 2 weeks ago.  Pain has persisted)    Patient is a 57-year-old male with a past medical history of ADHD, skin cancer, pre glaucoma, and migraines who presents to clinic for evaluation of right wrist injury.  Patient reports approximately 2 weeks ago he fell and tripped in a parking lot.  Patient states was carrying  an expensive tablet.  Patient states he tried to protect the tablet and raised his left hand any air during the fall.  Patient states attempted to catch himself with his right hand.  Patient states his right wrist bent backwards.  Patient states that he has continued to experience pain ever since the fall.  Patient states he initially experienced decreased range of motion of swollen right wrist however states that has began improving.  Patient states he has not experienced any abnormal sensation including numbness or tingling of the hand.  Patient denies any open wounds, skin discoloration to the hand.  Patient states he has taking previously prescribed Norco and gabapentin with relief to symptoms.  Patient states approximately 4 days ago he brought a splint in the wrap to wear on his wrist.  Patient states this seems to help as well.  Patient states he wants to be sure he does not have a fracture of the wrist.  Patient states that pain at current is a 2 on a 10 scale but as worst is a 6 or 7 on 10 scale.  Patient denies radiation of pain at this time.  Patient states pain is aggravated by lifting heavy objects or intermittently with palpation and varying movements of the wrist.  Patient states the splint and previously prescribed medications seem to help his symptoms.    Fall  The accident  occurred More than 1 week ago. The fall occurred while walking. He landed on [place]. There was no blood loss. The point of impact was the right wrist. The pain is present in the right wrist. The symptoms are aggravated by pressure on injury, movement and flexion. Pertinent negatives include no abdominal pain, fever, headaches, nausea, numbness or vomiting.     Constitution: Negative. Negative for chills, sweating, fatigue and fever.   HENT: Negative.     Neck: neck negative.   Cardiovascular: Negative.  Negative for chest pain and palpitations.   Eyes: Negative.    Respiratory: Negative.  Negative for chest tightness, cough and shortness of breath.    Gastrointestinal: Negative.  Negative for abdominal pain, nausea, vomiting and diarrhea.   Endocrine: negative.   Genitourinary: Negative.    Musculoskeletal:  Positive for trauma (Fall), joint pain (Right wrist), joint swelling (Right wrist reports now resolved) and abnormal ROM of joint (Right wrist reports now resolved).   Skin: Negative.  Negative for color change, pale, rash and wound.   Allergic/Immunologic: Negative.    Neurological: Negative.  Negative for dizziness, headaches, disorientation, altered mental status, numbness and tingling.   Hematologic/Lymphatic: Negative.    Psychiatric/Behavioral: Negative.  Negative for altered mental status, disorientation and confusion.      Objective:      Physical Exam   Constitutional: He is oriented to person, place, and time. He appears well-developed. He is cooperative.  Non-toxic appearance. He does not appear ill. No distress.   HENT:   Head: Normocephalic and atraumatic.   Ears:   Right Ear: Hearing, tympanic membrane, external ear and ear canal normal.   Left Ear: Hearing, tympanic membrane, external ear and ear canal normal.   Nose: Nose normal. No mucosal edema or nasal deformity. No epistaxis. Right sinus exhibits no maxillary sinus tenderness and no frontal sinus tenderness. Left sinus exhibits no maxillary  sinus tenderness and no frontal sinus tenderness.   Mouth/Throat: Uvula is midline, oropharynx is clear and moist and mucous membranes are normal. No trismus in the jaw. Normal dentition. No uvula swelling.   Eyes: Conjunctivae and lids are normal. Pupils are equal, round, and reactive to light. Right eye exhibits no discharge. Left eye exhibits no discharge. No scleral icterus.   Neck: Trachea normal and phonation normal. Neck supple.   Cardiovascular: Normal rate, regular rhythm, normal heart sounds and normal pulses.   Pulmonary/Chest: Effort normal and breath sounds normal. No respiratory distress. He has no wheezes. He has no rhonchi. He has no rales.   Abdominal: Normal appearance and bowel sounds are normal. He exhibits no distension. Soft. There is no abdominal tenderness.   Musculoskeletal:         General: Signs of injury present.      Right wrist: He exhibits tenderness. He exhibits normal range of motion, no swelling and no crepitus.   Neurological: He is alert and oriented to person, place, and time. He exhibits normal muscle tone.   Skin: Skin is warm, dry, intact and not diaphoretic.   Psychiatric: His speech is normal and behavior is normal. Judgment and thought content normal.   Nursing note and vitals reviewed.      Assessment:       1. Injury of right wrist, initial encounter    2. Right wrist sprain, initial encounter          Plan:         Injury of right wrist, initial encounter  -     XR WRIST COMPLETE 3 VIEWS RIGHT; Future; Expected date: 03/20/2023    Right wrist sprain, initial encounter    Other orders  -     ketorolac (TORADOL) 10 mg tablet; Take 1 tablet (10 mg total) by mouth every 6 (six) hours. for 5 days  Dispense: 20 tablet; Refill: 0                 X-ray right wrist: There is no evidence fracture or malalignment.  The adjacent soft tissues are unremarkable.  Rest.  Ice.  Compression.  Elevation.    Recommend continued use of right wrist splint as directed.    Take medications as  prescribed; use of no other NSAIDs while on Toradol.  May rotate with Tylenol.    Follow-up with PCP in 1-2 days.    Follow-up with orthopedics if symptoms not improving within 1-2 weeks.    Return to clinic as needed.    To ED for any new acutely worsening symptoms.    Patient in agreement with plan of care.    DISCLAIMER: Please note that my documentation in this Electronic Healthcare Record was produced using speech recognition software and therefore may contain errors related to that software system.These could include grammar, punctuation and spelling errors or the inclusion/exclusion of phrases that were not intended. Garbled syntax, mangled pronouns, and other bizarre constructions may be attributed to that software system.

## 2023-03-22 ENCOUNTER — OFFICE VISIT (OUTPATIENT)
Dept: FAMILY MEDICINE | Facility: CLINIC | Age: 58
End: 2023-03-22
Payer: COMMERCIAL

## 2023-03-22 VITALS
WEIGHT: 268.94 LBS | HEART RATE: 77 BPM | RESPIRATION RATE: 16 BRPM | OXYGEN SATURATION: 99 % | HEIGHT: 72 IN | BODY MASS INDEX: 36.43 KG/M2 | DIASTOLIC BLOOD PRESSURE: 80 MMHG | SYSTOLIC BLOOD PRESSURE: 136 MMHG

## 2023-03-22 DIAGNOSIS — K21.9 GASTROESOPHAGEAL REFLUX DISEASE WITHOUT ESOPHAGITIS: ICD-10-CM

## 2023-03-22 DIAGNOSIS — G43.009 MIGRAINE WITHOUT AURA AND WITHOUT STATUS MIGRAINOSUS, NOT INTRACTABLE: ICD-10-CM

## 2023-03-22 DIAGNOSIS — L98.9 SKIN LESION: ICD-10-CM

## 2023-03-22 DIAGNOSIS — Z12.11 COLON CANCER SCREENING: ICD-10-CM

## 2023-03-22 DIAGNOSIS — F51.01 PRIMARY INSOMNIA: Primary | ICD-10-CM

## 2023-03-22 DIAGNOSIS — J30.89 ALLERGIC RHINITIS DUE TO OTHER ALLERGIC TRIGGER, UNSPECIFIED SEASONALITY: ICD-10-CM

## 2023-03-22 PROCEDURE — 1159F MED LIST DOCD IN RCRD: CPT | Mod: S$GLB,,, | Performed by: INTERNAL MEDICINE

## 2023-03-22 PROCEDURE — 3075F PR MOST RECENT SYSTOLIC BLOOD PRESS GE 130-139MM HG: ICD-10-PCS | Mod: S$GLB,,, | Performed by: INTERNAL MEDICINE

## 2023-03-22 PROCEDURE — 3075F SYST BP GE 130 - 139MM HG: CPT | Mod: S$GLB,,, | Performed by: INTERNAL MEDICINE

## 2023-03-22 PROCEDURE — 99213 PR OFFICE/OUTPT VISIT, EST, LEVL III, 20-29 MIN: ICD-10-PCS | Mod: S$GLB,,, | Performed by: INTERNAL MEDICINE

## 2023-03-22 PROCEDURE — 3008F PR BODY MASS INDEX (BMI) DOCUMENTED: ICD-10-PCS | Mod: S$GLB,,, | Performed by: INTERNAL MEDICINE

## 2023-03-22 PROCEDURE — 1159F PR MEDICATION LIST DOCUMENTED IN MEDICAL RECORD: ICD-10-PCS | Mod: S$GLB,,, | Performed by: INTERNAL MEDICINE

## 2023-03-22 PROCEDURE — 99213 OFFICE O/P EST LOW 20 MIN: CPT | Mod: S$GLB,,, | Performed by: INTERNAL MEDICINE

## 2023-03-22 PROCEDURE — 3008F BODY MASS INDEX DOCD: CPT | Mod: S$GLB,,, | Performed by: INTERNAL MEDICINE

## 2023-03-22 PROCEDURE — 3079F DIAST BP 80-89 MM HG: CPT | Mod: S$GLB,,, | Performed by: INTERNAL MEDICINE

## 2023-03-22 PROCEDURE — 3079F PR MOST RECENT DIASTOLIC BLOOD PRESSURE 80-89 MM HG: ICD-10-PCS | Mod: S$GLB,,, | Performed by: INTERNAL MEDICINE

## 2023-03-22 RX ORDER — ZOLPIDEM TARTRATE 12.5 MG/1
12.5 TABLET, FILM COATED, EXTENDED RELEASE ORAL NIGHTLY
Qty: 30 TABLET | Refills: 0 | Status: SHIPPED | OUTPATIENT
Start: 2023-03-22 | End: 2023-05-24

## 2023-03-22 RX ORDER — PROMETHAZINE HYDROCHLORIDE 25 MG/1
TABLET ORAL
Qty: 15 TABLET | Refills: 1 | Status: SHIPPED | OUTPATIENT
Start: 2023-03-22 | End: 2023-05-24

## 2023-03-22 RX ORDER — OMEPRAZOLE 40 MG/1
40 CAPSULE, DELAYED RELEASE ORAL EVERY MORNING
Qty: 90 CAPSULE | Refills: 3 | Status: SHIPPED | OUTPATIENT
Start: 2023-03-22

## 2023-03-22 RX ORDER — NAPROXEN SODIUM 220 MG
220 TABLET ORAL 2 TIMES DAILY WITH MEALS
COMMUNITY

## 2023-03-22 RX ORDER — BRIMONIDINE TARTRATE AND TIMOLOL MALEATE 2; 5 MG/ML; MG/ML
1 SOLUTION OPHTHALMIC NIGHTLY
COMMUNITY

## 2023-03-22 RX ORDER — MONTELUKAST SODIUM 10 MG/1
10 TABLET ORAL DAILY
Qty: 90 TABLET | Refills: 3 | Status: SHIPPED | OUTPATIENT
Start: 2023-03-22

## 2023-03-22 NOTE — Clinical Note
Had his last colonoscopy in [facility] by Dr. Ortiz, gastroenterologist,[phone] office number

## 2023-03-22 NOTE — PROGRESS NOTES
Subjective:       Patient ID: Jason Swartz is a 57 y.o. male.    Chief Complaint: Follow-up (Medication refill. Request referral for dermatology and GI.)    Patient presents for follow-up on medication refill.    Prescription for singular, omeprazole, zolpidem will be issued today.  Urine for toxicology has been obtained.  Prescription monitoring program shows no discrepancies.    Vital signs are stable.      He has a history of having had a lesion taken out of the right temporal area which has heal well but he feels like the level coming back so he request referral back to Dermatology.  I have issued the referral to Dr. Jona Simpson in [place] and they will call him with regards to final scheduling details.    He has a history of colitis and has undergone 5 to sees colonoscopy so far and the last 1 was performed in [facility] by Dr. Ortiz.  I requested for this information to be obtained by the clinical care coordinator but patient has been referred to Dr. Acosta for screening colonoscopy.  Prefers for a to be performed [facility] for the sake of convenience on the will call him a arrange final schedule details.      Regarding the care gaps he has been provided with an order for the shingles vaccination after he had the shingles he does not want to have it at her again.  HIV screening is to be deleted being that he does not have high risk behavior.  He can receive a tetanus vaccination at his convenience.    Lab work from his last wellness examination performed this facility November 7, 2022 have been reviewed and there were unremarkable.  A1c December 07/20/2020 was unremarkable at 5.8.  Repeated at the time of the wellness.    Review of system is otherwise unremarkable.      Medication list was reviewed and there is no need for any other refills.              Review of Systems   All other systems reviewed and are negative.      Objective:      Physical Exam  Vitals and nursing  note reviewed.   Constitutional:       General: He is not in acute distress.     Appearance: Normal appearance. He is obese. He is not ill-appearing, toxic-appearing or diaphoretic.   HENT:      Head: Normocephalic and atraumatic.   Cardiovascular:      Rate and Rhythm: Normal rate and regular rhythm.      Pulses: Normal pulses.   Pulmonary:      Effort: Pulmonary effort is normal.   Skin:     General: Skin is warm and dry.      Coloration: Skin is not jaundiced or pale.      Comments: Area of scar on the bitemporal area does not have any stigmata of a malignant lesion at this time.   Neurological:      General: No focal deficit present.      Mental Status: He is alert and oriented to person, place, and time. Mental status is at baseline.      Cranial Nerves: No cranial nerve deficit.      Sensory: No sensory deficit.      Motor: No weakness.      Coordination: Coordination normal.      Gait: Gait normal.   Psychiatric:         Mood and Affect: Mood normal.         Behavior: Behavior normal.         Thought Content: Thought content normal.         Judgment: Judgment normal.       Assessment:       Problem List Items Addressed This Visit          Neuro    Migraine without status migrainosus, not intractable    Relevant Medications    promethazine (PHENERGAN) 25 MG tablet       ENT    Allergic rhinitis    Relevant Medications    montelukast (SINGULAIR) 10 mg tablet     Other Visit Diagnoses       Primary insomnia    -  Primary    Relevant Medications    zolpidem (AMBIEN CR) 12.5 MG CR tablet    Colon cancer screening        Relevant Orders    Ambulatory referral/consult to Gastroenterology    Gastroesophageal reflux disease without esophagitis        Relevant Medications    omeprazole (PRILOSEC) 40 MG capsule    Skin lesion        Relevant Orders    Ambulatory referral/consult to Dermatology              Plan:       Patient will be referred to Gastroenterology in [facility] for repeat colonoscopy for colon cancer screening.       He will be referred to Dermatology in [place] for screening for skin cancer.    Prescription for omeprazole, singular, zolpidem have been issued today.      Prescription monitoring program shows no discrepancies.      Lab work from November 7, 2022 has been reviewed and there is no need for repeat for wellness exam until after November 7, 2023.    Patient continues follow-up with primary neurologist for his chronic migraine.

## 2023-03-24 ENCOUNTER — PATIENT OUTREACH (OUTPATIENT)
Dept: ADMINISTRATIVE | Facility: HOSPITAL | Age: 58
End: 2023-03-24
Payer: COMMERCIAL

## 2023-04-12 ENCOUNTER — PATIENT MESSAGE (OUTPATIENT)
Dept: ADMINISTRATIVE | Facility: HOSPITAL | Age: 58
End: 2023-04-12
Payer: COMMERCIAL

## 2023-04-18 ENCOUNTER — PATIENT OUTREACH (OUTPATIENT)
Dept: ADMINISTRATIVE | Facility: HOSPITAL | Age: 58
End: 2023-04-18
Payer: COMMERCIAL

## 2023-04-18 NOTE — PROGRESS NOTES
Patient given contact number to Dr Minor Acosta's office to follow up on Gastro referral put in by Dr Crespo in March.

## 2023-04-18 NOTE — PROGRESS NOTES
PORTAL MESSAGE SENT TO PATIENT GIVING HIM THE PHONE NUMBER TO DR PERRY'S OFFICE TO FOLLOW UP ON EXTERNAL REFERRAL PUT IN MARCH.  ALSO, GIVEN PHONE NUMBER TO SCHEDULING TO FOLLOW UP ON DERMATOLOGY REFERRAL ALSO PUT IN MARCH.

## 2023-05-24 DIAGNOSIS — F51.01 PRIMARY INSOMNIA: ICD-10-CM

## 2023-05-24 DIAGNOSIS — G43.009 MIGRAINE WITHOUT AURA AND WITHOUT STATUS MIGRAINOSUS, NOT INTRACTABLE: ICD-10-CM

## 2023-05-24 RX ORDER — ZOLPIDEM TARTRATE 12.5 MG/1
TABLET, FILM COATED, EXTENDED RELEASE ORAL
Qty: 30 TABLET | Refills: 0 | Status: SHIPPED | OUTPATIENT
Start: 2023-05-24 | End: 2023-06-22

## 2023-05-24 RX ORDER — PROMETHAZINE HYDROCHLORIDE 25 MG/1
TABLET ORAL
Qty: 15 TABLET | Refills: 1 | Status: SHIPPED | OUTPATIENT
Start: 2023-05-24 | End: 2023-08-29

## 2023-06-07 ENCOUNTER — OFFICE VISIT (OUTPATIENT)
Dept: FAMILY MEDICINE | Facility: CLINIC | Age: 58
End: 2023-06-07
Payer: COMMERCIAL

## 2023-06-07 ENCOUNTER — TELEPHONE (OUTPATIENT)
Dept: FAMILY MEDICINE | Facility: CLINIC | Age: 58
End: 2023-06-07

## 2023-06-07 VITALS
SYSTOLIC BLOOD PRESSURE: 120 MMHG | BODY MASS INDEX: 37.24 KG/M2 | TEMPERATURE: 98 F | DIASTOLIC BLOOD PRESSURE: 82 MMHG | HEART RATE: 73 BPM | HEIGHT: 72 IN | WEIGHT: 274.94 LBS | OXYGEN SATURATION: 97 %

## 2023-06-07 DIAGNOSIS — R10.30 LOWER ABDOMINAL PAIN, UNSPECIFIED: Primary | ICD-10-CM

## 2023-06-07 DIAGNOSIS — K62.5 BRBPR (BRIGHT RED BLOOD PER RECTUM): ICD-10-CM

## 2023-06-07 LAB
BILIRUBIN, UA POC OHS: NEGATIVE
BLOOD, UA POC OHS: NEGATIVE
CLARITY, UA POC OHS: CLEAR
COLOR, UA POC OHS: YELLOW
GLUCOSE, UA POC OHS: NEGATIVE
KETONES, UA POC OHS: NEGATIVE
LEUKOCYTES, UA POC OHS: NEGATIVE
NITRITE, UA POC OHS: NEGATIVE
PH, UA POC OHS: 5.5
PROTEIN, UA POC OHS: NEGATIVE
SPECIFIC GRAVITY, UA POC OHS: 1.01
UROBILINOGEN, UA POC OHS: 0.2

## 2023-06-07 PROCEDURE — 3008F PR BODY MASS INDEX (BMI) DOCUMENTED: ICD-10-PCS | Mod: S$GLB,,, | Performed by: INTERNAL MEDICINE

## 2023-06-07 PROCEDURE — 99213 OFFICE O/P EST LOW 20 MIN: CPT | Mod: S$GLB,,, | Performed by: INTERNAL MEDICINE

## 2023-06-07 PROCEDURE — 81003 URINALYSIS AUTO W/O SCOPE: CPT | Mod: QW,S$GLB,, | Performed by: INTERNAL MEDICINE

## 2023-06-07 PROCEDURE — 3079F DIAST BP 80-89 MM HG: CPT | Mod: S$GLB,,, | Performed by: INTERNAL MEDICINE

## 2023-06-07 PROCEDURE — 3074F PR MOST RECENT SYSTOLIC BLOOD PRESSURE < 130 MM HG: ICD-10-PCS | Mod: S$GLB,,, | Performed by: INTERNAL MEDICINE

## 2023-06-07 PROCEDURE — 81003 POCT URINALYSIS(INSTRUMENT): ICD-10-PCS | Mod: QW,S$GLB,, | Performed by: INTERNAL MEDICINE

## 2023-06-07 PROCEDURE — 3008F BODY MASS INDEX DOCD: CPT | Mod: S$GLB,,, | Performed by: INTERNAL MEDICINE

## 2023-06-07 PROCEDURE — 1159F PR MEDICATION LIST DOCUMENTED IN MEDICAL RECORD: ICD-10-PCS | Mod: S$GLB,,, | Performed by: INTERNAL MEDICINE

## 2023-06-07 PROCEDURE — 99213 PR OFFICE/OUTPT VISIT, EST, LEVL III, 20-29 MIN: ICD-10-PCS | Mod: S$GLB,,, | Performed by: INTERNAL MEDICINE

## 2023-06-07 PROCEDURE — 1159F MED LIST DOCD IN RCRD: CPT | Mod: S$GLB,,, | Performed by: INTERNAL MEDICINE

## 2023-06-07 PROCEDURE — 3074F SYST BP LT 130 MM HG: CPT | Mod: S$GLB,,, | Performed by: INTERNAL MEDICINE

## 2023-06-07 PROCEDURE — 3079F PR MOST RECENT DIASTOLIC BLOOD PRESSURE 80-89 MM HG: ICD-10-PCS | Mod: S$GLB,,, | Performed by: INTERNAL MEDICINE

## 2023-06-07 RX ORDER — LATANOPROSTENE BUNOD 0.24 MG/ML
1 SOLUTION/ DROPS OPHTHALMIC
COMMUNITY

## 2023-06-07 RX ORDER — BUTALBITAL, ASPIRIN, AND CAFFEINE 325; 50; 40 MG/1; MG/1; MG/1
1 CAPSULE ORAL DAILY PRN
COMMUNITY
Start: 2023-06-06

## 2023-06-07 RX ORDER — BUTALBITAL, ASPIRIN, AND CAFFEINE 325; 50; 40 MG/1; MG/1; MG/1
1 CAPSULE ORAL
COMMUNITY
Start: 2023-05-30

## 2023-06-07 NOTE — PROGRESS NOTES
Subjective     Patient ID: Jason Swartz is a 57 y.o. male.    Chief Complaint: Bleeding/Bruising (Bloody mucous/Started yesterday/Blood in underwear/)    Patient presents with complaint of having significant signs of bleeding in his underwear yesterday upon arriving to his house.  He said he has been experiencing constipation lately and yesterday he did not have a bowel movement.  When he got home and went to this road he noticed that he had a lot of blood on his underwear anteriorly and in the perineal area.  There was nothing that he could feel protruding from his anus nor was there anything that would suggest a tear at the time of having a bowel movement.  Patient has no history of nephrolithiasis and he could not really pinpoint whether he actually had bleeding through the penis yesterday.  It was not clear whether he had a skin lesion in the perineal area either.    Examination by me now shows no external hemorrhoids, no obvious he not tears or signs of recent bleeding in the perianal area, perineal area or scrotum.    Urinalysis has been carried out which showed no blood by instrument.    Being that the patient has a remote history of colitis I have contacted Gastroenterology, Dr. Tristian mcqueen and they will contact the patient to try and get a colonoscopy scheduled [facility] on June 13, 2023.  Patient will be contacted by his office with regards to the bowel prep on scheduling details.      Patient also has experienced some episodes of epistaxis which appears to be related to chronic use of fluticasone nasal spray.  This if anything he just confusing the picture more and definitely does not add the picture that he showed me of his underwear aspect yesterday.    He has not been having significant pain on defecation and nothing to suggest a proctitis either.    Review of Systems   All other systems reviewed and are negative.       Objective     Physical Exam  Vitals and nursing note  reviewed.   Constitutional:       General: He is not in acute distress.     Appearance: Normal appearance. He is obese. He is not ill-appearing, toxic-appearing or diaphoretic.   HENT:      Head: Normocephalic and atraumatic.   Cardiovascular:      Rate and Rhythm: Normal rate.      Pulses: Normal pulses.   Pulmonary:      Effort: Pulmonary effort is normal.   Abdominal:      General: There is no distension.      Tenderness: There is no abdominal tenderness. There is no guarding or rebound.   Genitourinary:     Comments: As per the HPI  Musculoskeletal:         General: No swelling or tenderness.   Skin:     General: Skin is warm and dry.      Coloration: Skin is not jaundiced or pale.   Neurological:      General: No focal deficit present.      Mental Status: He is alert and oriented to person, place, and time. Mental status is at baseline.      Cranial Nerves: No cranial nerve deficit.      Sensory: No sensory deficit.      Motor: No weakness.      Coordination: Coordination normal.      Gait: Gait normal.   Psychiatric:         Mood and Affect: Mood normal.         Behavior: Behavior normal.         Thought Content: Thought content normal.         Judgment: Judgment normal.          Assessment and Plan     1. Lower abdominal pain, unspecified  -     POCT Urinalysis(Instrument)    2. BRBPR (bright red blood per rectum)        Because of these very atypical presentation with gross signs of bleeding of the patient's under were on the remote history of colitis come I have contacted Gastroenterology and the patient will be scheduled for a colonoscopy by June 13, 2023 possible at [facility] per patient's request.  I will ease him being able to get his on-call to take him there and back for the appointment.    He will be contacted by the gastroenterologist office as it pertains to the final scheduling details.      In the meantime he is to avoid any and all nonsteroidal anti-inflammatory agents and  aspirin like medications to decrease the chances of bleeding complication.      On the fluticasone is okay for him to decrease the frequency of use being that he is experiencing epistaxis probably related to the chronic use with significant nasal mucosa atrophy.    Lab work last performed November 7, 2022 at the time of his last wellness examination has been reviewed and was all unremarkable and does not warrant any repeat.    Otherwise patient is very well aware of the signs and symptoms to watch for and to seek medical attention in case these appear.         No follow-ups on file.

## 2023-06-22 DIAGNOSIS — F51.01 PRIMARY INSOMNIA: ICD-10-CM

## 2023-06-22 RX ORDER — ZOLPIDEM TARTRATE 12.5 MG/1
TABLET, FILM COATED, EXTENDED RELEASE ORAL
Qty: 30 TABLET | Refills: 0 | Status: SHIPPED | OUTPATIENT
Start: 2023-06-22 | End: 2023-07-19 | Stop reason: SDUPTHER

## 2023-07-19 DIAGNOSIS — F51.01 PRIMARY INSOMNIA: ICD-10-CM

## 2023-07-20 RX ORDER — ZOLPIDEM TARTRATE 12.5 MG/1
TABLET, FILM COATED, EXTENDED RELEASE ORAL
Qty: 30 TABLET | Refills: 0 | Status: SHIPPED | OUTPATIENT
Start: 2023-07-20 | End: 2023-08-14 | Stop reason: SDUPTHER

## 2023-08-10 ENCOUNTER — OFFICE VISIT (OUTPATIENT)
Dept: FAMILY MEDICINE | Facility: CLINIC | Age: 58
End: 2023-08-10
Payer: COMMERCIAL

## 2023-08-10 VITALS
HEIGHT: 72 IN | DIASTOLIC BLOOD PRESSURE: 80 MMHG | WEIGHT: 275.56 LBS | TEMPERATURE: 98 F | OXYGEN SATURATION: 95 % | BODY MASS INDEX: 37.32 KG/M2 | SYSTOLIC BLOOD PRESSURE: 114 MMHG | HEART RATE: 84 BPM

## 2023-08-10 DIAGNOSIS — H40.9 GLAUCOMA OF BOTH EYES, UNSPECIFIED GLAUCOMA TYPE: ICD-10-CM

## 2023-08-10 DIAGNOSIS — K62.5 BRBPR (BRIGHT RED BLOOD PER RECTUM): Primary | ICD-10-CM

## 2023-08-10 DIAGNOSIS — G43.009 MIGRAINE WITHOUT AURA AND WITHOUT STATUS MIGRAINOSUS, NOT INTRACTABLE: ICD-10-CM

## 2023-08-10 DIAGNOSIS — R04.0 EPISTAXIS: ICD-10-CM

## 2023-08-10 DIAGNOSIS — E66.9 OBESITY (BMI 35.0-39.9 WITHOUT COMORBIDITY): ICD-10-CM

## 2023-08-10 PROCEDURE — 99212 PR OFFICE/OUTPT VISIT, EST, LEVL II, 10-19 MIN: ICD-10-PCS | Mod: S$GLB,,, | Performed by: INTERNAL MEDICINE

## 2023-08-10 PROCEDURE — 3079F PR MOST RECENT DIASTOLIC BLOOD PRESSURE 80-89 MM HG: ICD-10-PCS | Mod: S$GLB,,, | Performed by: INTERNAL MEDICINE

## 2023-08-10 PROCEDURE — 1159F PR MEDICATION LIST DOCUMENTED IN MEDICAL RECORD: ICD-10-PCS | Mod: S$GLB,,, | Performed by: INTERNAL MEDICINE

## 2023-08-10 PROCEDURE — 3074F SYST BP LT 130 MM HG: CPT | Mod: S$GLB,,, | Performed by: INTERNAL MEDICINE

## 2023-08-10 PROCEDURE — 99212 OFFICE O/P EST SF 10 MIN: CPT | Mod: S$GLB,,, | Performed by: INTERNAL MEDICINE

## 2023-08-10 PROCEDURE — 1159F MED LIST DOCD IN RCRD: CPT | Mod: S$GLB,,, | Performed by: INTERNAL MEDICINE

## 2023-08-10 PROCEDURE — 3008F BODY MASS INDEX DOCD: CPT | Mod: S$GLB,,, | Performed by: INTERNAL MEDICINE

## 2023-08-10 PROCEDURE — 3008F PR BODY MASS INDEX (BMI) DOCUMENTED: ICD-10-PCS | Mod: S$GLB,,, | Performed by: INTERNAL MEDICINE

## 2023-08-10 PROCEDURE — 3079F DIAST BP 80-89 MM HG: CPT | Mod: S$GLB,,, | Performed by: INTERNAL MEDICINE

## 2023-08-10 PROCEDURE — 3074F PR MOST RECENT SYSTOLIC BLOOD PRESSURE < 130 MM HG: ICD-10-PCS | Mod: S$GLB,,, | Performed by: INTERNAL MEDICINE

## 2023-08-10 NOTE — PROGRESS NOTES
Subjective     Patient ID: Jason Swartz is a 57 y.o. male.    Chief Complaint: Follow-up (Patient had a recent colonoscopy on 6/13/23 for rectal bleeding. No signs of bleeding. Patient would like to discuss eye appt that was had on yesterday and report that medication is working effectively. ), Medication Review (Patient is here to review his current medication regimen. ), Documentation Only (Patient is requesting a physician signature and note to send to insurance company regarding the medication Ambien. ), and Immunizations (Patient is requesting another prescription for the shingles vaccine due to misplacing original copy. )    As per chief complaint.      Patient has had a complete GI workup and no obvious site of bleeding was identified.  Because of the presence of polyps he was recommended to have repeat colonoscopy in 3 years.  There has been no more episodes of rectal bleed.    Because of recurrent epistaxis he will consider referral to ear nose and throat physician.  It could be related to nasal mucosa atrophy because of continued use of fluticasone and I have instructed him to hold back on same.      Pertaining to his use of Ambien his insurance company only pays for 15 doses a month on we do not have any power over that he can always just pay cash for the other 15.    Wellness examination will be scheduled for end of the year.      Regarding his management of glaucoma he was told that he is intra-ocular pressure was 33 at the time of the examination and since he has been on Combigan on another medication that he can not remember the name but the pressure is now under control.  I have explained to the patient that if he would like I will be glad to refer him to Ophthalmology for a 2nd opinion in management.  He prefers to hold off on that for now.      He is to return to the clinic August 14 or after September 5th for a Tdap vaccination.  We can not get records of his last vaccine in [place].  For the shingles vaccination have been provided to him today.  He can go to a local chain pharmacy for same.  Is aware of the fact that he has to put a reminder on his phone to return 62 180 days after the 1st 1 for the 2nd and last dose.      His prescription for Ambien is up-to-date.  Prescription monitoring program shows no discrepancies and no urine for toxicology has been obtained.        Review of Systems   All other systems reviewed and are negative.         Objective     Physical Exam  Vitals and nursing note reviewed.   Constitutional:       General: He is not in acute distress.     Appearance: Normal appearance. He is obese. He is not ill-appearing, toxic-appearing or diaphoretic.   HENT:      Head: Normocephalic and atraumatic.   Cardiovascular:      Rate and Rhythm: Normal rate.      Pulses: Normal pulses.   Pulmonary:      Effort: Pulmonary effort is normal.   Skin:     General: Skin is warm and dry.      Coloration: Skin is not jaundiced or pale.   Neurological:      General: No focal deficit present.      Mental Status: He is alert and oriented to person, place, and time. Mental status is at baseline.      Cranial Nerves: No cranial nerve deficit.      Sensory: No sensory deficit.      Motor: No weakness.      Coordination: Coordination normal.      Gait: Gait normal.   Psychiatric:         Mood and Affect: Mood normal.         Behavior: Behavior normal.         Thought Content: Thought content normal.         Judgment: Judgment normal.            Assessment and Plan     1. BRBPR (bright red blood per rectum)    2. Epistaxis    3. Migraine without aura and without status migrainosus, not intractable    4. Obesity (BMI 35.0-39.9 without comorbidity)    5. Glaucoma of both eyes, unspecified glaucoma type        With regards to the episodes of bright red blood per rectum colonoscopy was unremarkable except for 3 polyps that were hyperplastic and patient was recommended to have a repeat  colonoscopy in 3 years.  He will continue follow-up with Gastroenterology as planned.      Regarding the epistaxis prior related to the continued use of the fluticasone nasal spray for his chronic allergic rhinitis and this could cause nasal mucosa atrophy with epistaxis.  I have recommended for the patient to hold off on the fluticasone for now and if he has another bad episode he can contact me and I will refer him for the ear nose and throat physician evaluation.      He is up-to-date with follow-up with Neurology regarding the management of his migraine.      He is up-to-date with follow-up with optometry pertaining to his glaucoma and he was told that his intra-ocular pressure was improved with the Combigan on another medication which he can not remember the name.  If he prefers to get an opinion form ophthalmology I will be glad to arrange for same.  He prefers to hold off of need for now.      Wellness examination will be carried out before December 31st.      Tdap can be administered here at his convenience.      Shingles vaccination orders have been provided.         No follow-ups on file.

## 2023-08-14 DIAGNOSIS — F51.01 PRIMARY INSOMNIA: ICD-10-CM

## 2023-08-14 RX ORDER — ZOLPIDEM TARTRATE 12.5 MG/1
TABLET, FILM COATED, EXTENDED RELEASE ORAL
Qty: 30 TABLET | Refills: 0 | Status: SHIPPED | OUTPATIENT
Start: 2023-08-17

## 2023-08-28 DIAGNOSIS — G43.009 MIGRAINE WITHOUT AURA AND WITHOUT STATUS MIGRAINOSUS, NOT INTRACTABLE: ICD-10-CM

## 2023-08-29 RX ORDER — PROMETHAZINE HYDROCHLORIDE 25 MG/1
TABLET ORAL
Qty: 15 TABLET | Refills: 1 | Status: SHIPPED | OUTPATIENT
Start: 2023-08-29

## 2023-09-03 ENCOUNTER — OFFICE VISIT (OUTPATIENT)
Dept: URGENT CARE | Facility: CLINIC | Age: 58
End: 2023-09-03
Payer: COMMERCIAL

## 2023-09-03 VITALS
RESPIRATION RATE: 16 BRPM | BODY MASS INDEX: 37.03 KG/M2 | TEMPERATURE: 98 F | SYSTOLIC BLOOD PRESSURE: 156 MMHG | DIASTOLIC BLOOD PRESSURE: 97 MMHG | HEART RATE: 62 BPM | OXYGEN SATURATION: 96 % | WEIGHT: 273 LBS

## 2023-09-03 DIAGNOSIS — J02.9 ACUTE PHARYNGITIS, UNSPECIFIED ETIOLOGY: ICD-10-CM

## 2023-09-03 DIAGNOSIS — J02.9 SORE THROAT: Primary | ICD-10-CM

## 2023-09-03 DIAGNOSIS — J06.9 VIRAL URI WITH COUGH: ICD-10-CM

## 2023-09-03 DIAGNOSIS — Z20.822 EXPOSURE TO COVID-19 VIRUS: ICD-10-CM

## 2023-09-03 LAB
CTP QC/QA: YES
CTP QC/QA: YES
S PYO RRNA THROAT QL PROBE: NEGATIVE
SARS-COV-2 AG RESP QL IA.RAPID: NEGATIVE

## 2023-09-03 PROCEDURE — 87880 POCT RAPID STREP A: ICD-10-PCS | Mod: QW,,, | Performed by: STUDENT IN AN ORGANIZED HEALTH CARE EDUCATION/TRAINING PROGRAM

## 2023-09-03 PROCEDURE — 99214 PR OFFICE/OUTPT VISIT, EST, LEVL IV, 30-39 MIN: ICD-10-PCS | Mod: S$GLB,,, | Performed by: STUDENT IN AN ORGANIZED HEALTH CARE EDUCATION/TRAINING PROGRAM

## 2023-09-03 PROCEDURE — 87811 SARS-COV-2 COVID19 W/OPTIC: CPT | Mod: QW,S$GLB,, | Performed by: STUDENT IN AN ORGANIZED HEALTH CARE EDUCATION/TRAINING PROGRAM

## 2023-09-03 PROCEDURE — 99214 OFFICE O/P EST MOD 30 MIN: CPT | Mod: S$GLB,,, | Performed by: STUDENT IN AN ORGANIZED HEALTH CARE EDUCATION/TRAINING PROGRAM

## 2023-09-03 PROCEDURE — 87811 SARS CORONAVIRUS 2 ANTIGEN POCT, MANUAL READ: ICD-10-PCS | Mod: QW,S$GLB,, | Performed by: STUDENT IN AN ORGANIZED HEALTH CARE EDUCATION/TRAINING PROGRAM

## 2023-09-03 PROCEDURE — 87880 STREP A ASSAY W/OPTIC: CPT | Mod: QW,,, | Performed by: STUDENT IN AN ORGANIZED HEALTH CARE EDUCATION/TRAINING PROGRAM

## 2023-09-03 RX ORDER — LEVOCETIRIZINE DIHYDROCHLORIDE 5 MG/1
5 TABLET, FILM COATED ORAL NIGHTLY
Qty: 30 TABLET | Refills: 0 | Status: SHIPPED | OUTPATIENT
Start: 2023-09-03 | End: 2023-09-03

## 2023-09-03 RX ORDER — LEVOCETIRIZINE DIHYDROCHLORIDE 5 MG/1
5 TABLET, FILM COATED ORAL NIGHTLY
Qty: 90 TABLET | Refills: 0 | Status: SHIPPED | OUTPATIENT
Start: 2023-09-03

## 2023-09-03 RX ORDER — BENZOCAINE/MENTHOL 15 MG-10MG
1 LOZENGE MUCOUS MEMBRANE
Qty: 30 LOZENGE | Refills: 0 | Status: SHIPPED | OUTPATIENT
Start: 2023-09-03

## 2023-09-03 RX ORDER — METHYLPREDNISOLONE 4 MG/1
TABLET ORAL
Qty: 21 EACH | Refills: 0 | Status: SHIPPED | OUTPATIENT
Start: 2023-09-03 | End: 2023-09-24

## 2023-09-03 NOTE — PROGRESS NOTES
Subjective:      Patient ID: Jason Swartz is a 57 y.o. male.    Vitals:  weight is 123.8 kg (273 lb). His oral temperature is 98.2 °F (36.8 °C). His blood pressure is 156/97 (abnormal) and his pulse is 62. His respiration is 16 and oxygen saturation is 96%.     Chief Complaint: Sore Throat    Patient is a 57-year-old male with a past medical history of ADHD, skin cancer, pre glaucoma, and migraines who presents to clinic for evaluation of sore throat.  Patient reports he is vaccinated.  Patient reports symptoms for 3-4 days now.  Patient reports positive sick exposure as 1 co-worker has COVID and 3 others are having similar symptoms as he does.  Patient states that he has experienced nasal sinus congestion with postnasal drainage, sore throat with some painful swallowing and a slight an infrequent nonproductive cough.  Patient states that he has used Flonase however no other over-the-counter medications for symptoms at this point.  Patient states he did home COVID test a few days ago and it was negative.    Sore Throat   This is a new problem. The current episode started in the past 7 days. The problem has been unchanged. There has been no fever. Associated symptoms include congestion and trouble swallowing. Pertinent negatives include no abdominal pain, coughing, diarrhea, drooling, ear pain, headaches, shortness of breath or vomiting.       Constitution: Negative. Negative for chills, sweating, fatigue and fever.   HENT:  Positive for congestion, postnasal drip, sore throat and trouble swallowing. Negative for ear pain, drooling and voice change.    Neck: neck negative.   Cardiovascular: Negative.  Negative for chest pain and palpitations.   Eyes: Negative.    Respiratory: Negative.  Negative for chest tightness, cough and shortness of breath.    Gastrointestinal: Negative.  Negative for abdominal pain, nausea, vomiting and diarrhea.   Endocrine: negative.   Genitourinary: Negative.    Musculoskeletal:  Negative.  Negative for muscle ache.   Skin: Negative.  Negative for color change, pale, rash and erythema.   Allergic/Immunologic: Negative.    Neurological: Negative.  Negative for dizziness, light-headedness, passing out, headaches, disorientation and altered mental status.   Hematologic/Lymphatic: Negative.    Psychiatric/Behavioral: Negative.  Negative for altered mental status, disorientation and confusion.       Objective:     Physical Exam   Constitutional: He is oriented to person, place, and time. He appears well-developed. He is cooperative.  Non-toxic appearance. He does not appear ill. No distress.   HENT:   Head: Normocephalic and atraumatic.   Ears:   Right Ear: Hearing, tympanic membrane, external ear and ear canal normal.   Left Ear: Hearing, tympanic membrane, external ear and ear canal normal.   Nose: Congestion present. No mucosal edema, rhinorrhea or nasal deformity. No epistaxis. Right sinus exhibits no maxillary sinus tenderness and no frontal sinus tenderness. Left sinus exhibits no maxillary sinus tenderness and no frontal sinus tenderness.   Mouth/Throat: Uvula is midline and mucous membranes are normal. Mucous membranes are moist. No trismus in the jaw. Normal dentition. No uvula swelling. Posterior oropharyngeal erythema (Mildly erythemic without cobblestoning or exudate.  Mild postnasal drainage oropharynx.) present. No oropharyngeal exudate. Oropharynx is clear.   Eyes: Conjunctivae and lids are normal. Pupils are equal, round, and reactive to light. Right eye exhibits no discharge. Left eye exhibits no discharge. No scleral icterus.   Neck: Trachea normal and phonation normal. Neck supple. No neck rigidity present.   Cardiovascular: Normal rate, regular rhythm, normal heart sounds and normal pulses.   Pulmonary/Chest: Effort normal and breath sounds normal. No respiratory distress. He has no wheezes. He has no rhonchi. He has no rales.   Abdominal: Normal appearance and bowel sounds  are normal. He exhibits no distension. Soft. There is no abdominal tenderness.   Musculoskeletal: Normal range of motion.         General: Normal range of motion.      Cervical back: He exhibits no tenderness.   Lymphadenopathy:     He has no cervical adenopathy.   Neurological: He is alert and oriented to person, place, and time. He exhibits normal muscle tone.   Skin: Skin is warm, dry, intact, not diaphoretic, not pale and no rash. Capillary refill takes 2 to 3 seconds. No erythema   Psychiatric: His speech is normal and behavior is normal. Judgment and thought content normal.   Nursing note and vitals reviewed.      Assessment:     1. Sore throat    2. Exposure to COVID-19 virus    3. Acute pharyngitis, unspecified etiology    4. Viral URI with cough        Plan:       Sore throat  -     POCT rapid strep A  -     SARS Coronavirus 2 Antigen, POCT Manual Read    Exposure to COVID-19 virus  -     SARS Coronavirus 2 Antigen, POCT Manual Read    Acute pharyngitis, unspecified etiology    Viral URI with cough    Other orders  -     methylPREDNISolone (MEDROL DOSEPACK) 4 mg tablet; use as directed  Dispense: 21 each; Refill: 0  -     levocetirizine (XYZAL) 5 MG tablet; Take 1 tablet (5 mg total) by mouth every evening.  Dispense: 30 tablet; Refill: 0  -     benzocaine-menthoL (CHLORASEPTIC MAX) 15-10 mg Lozg; 1 lozenge by Mucous Membrane route every 2 (two) hours as needed (Sore throat).  Dispense: 30 lozenge; Refill: 0                Labs:  COVID negative.  Rapid strep negative.    Take medications as prescribed.    Warm salt water gargles every 2-3 hours as needed for sore throat.    Continue Flonase as directed.    Assure adequate hydration.    Follow-up with PCP in 1-2 days.    Return to clinic as needed.    To ED for any new acutely worsening symptoms.    Patient in agreement with plan of care.    DISCLAIMER: Please note that my documentation in this Electronic Healthcare Record was produced using speech  recognition software and therefore may contain errors related to that software system.These could include grammar, punctuation and spelling errors or the inclusion/exclusion of phrases that were not intended. Garbled syntax, mangled pronouns, and other bizarre constructions may be attributed to that software system.

## 2024-05-24 ENCOUNTER — OFFICE VISIT (OUTPATIENT)
Dept: URGENT CARE | Facility: CLINIC | Age: 59
End: 2024-05-24
Payer: COMMERCIAL

## 2024-05-24 VITALS
BODY MASS INDEX: 37.65 KG/M2 | WEIGHT: 278 LBS | OXYGEN SATURATION: 96 % | HEIGHT: 72 IN | DIASTOLIC BLOOD PRESSURE: 83 MMHG | RESPIRATION RATE: 18 BRPM | HEART RATE: 73 BPM | TEMPERATURE: 98 F | SYSTOLIC BLOOD PRESSURE: 127 MMHG

## 2024-05-24 DIAGNOSIS — B02.9 HERPES ZOSTER WITHOUT COMPLICATION: Primary | ICD-10-CM

## 2024-05-24 PROBLEM — L30.9 ECZEMA: Status: ACTIVE | Noted: 2024-05-24

## 2024-05-24 PROBLEM — J01.90 ACUTE SINUSITIS: Status: ACTIVE | Noted: 2024-05-24

## 2024-05-24 PROBLEM — K40.90 INGUINAL HERNIA: Status: ACTIVE | Noted: 2024-05-24

## 2024-05-24 PROBLEM — M54.9 DORSALGIA, UNSPECIFIED: Status: ACTIVE | Noted: 2022-07-10

## 2024-05-24 PROBLEM — K59.00 CONSTIPATION: Status: ACTIVE | Noted: 2024-05-24

## 2024-05-24 PROBLEM — R05.9 COUGH: Status: ACTIVE | Noted: 2024-05-24

## 2024-05-24 PROCEDURE — 99214 OFFICE O/P EST MOD 30 MIN: CPT | Mod: S$GLB,,, | Performed by: NURSE PRACTITIONER

## 2024-05-24 RX ORDER — ACYCLOVIR 800 MG/1
800 TABLET ORAL
Qty: 35 TABLET | Refills: 0 | Status: SHIPPED | OUTPATIENT
Start: 2024-05-24 | End: 2024-05-31

## 2024-05-24 RX ORDER — GABAPENTIN 300 MG/1
300 CAPSULE ORAL 2 TIMES DAILY
Qty: 14 CAPSULE | Refills: 0 | Status: SHIPPED | OUTPATIENT
Start: 2024-05-24 | End: 2024-05-31

## 2024-05-24 RX ORDER — GALCANEZUMAB 120 MG/ML
120 INJECTION, SOLUTION SUBCUTANEOUS
COMMUNITY

## 2024-05-24 RX ORDER — PREDNISONE 20 MG/1
20 TABLET ORAL DAILY
Qty: 5 TABLET | Refills: 0 | Status: SHIPPED | OUTPATIENT
Start: 2024-05-24 | End: 2024-05-29

## 2024-05-24 NOTE — PROGRESS NOTES
"CHIEF COMPLAINT  Chief Complaint   Patient presents with    Rash       HPI  Jason Arnold a 58 y.o. male who presents with c/o rash to right cheek for the past 3 days. Pt reports similar episode 2 years ago to same area when diagnosed with shingles. Pt denies fever, drainage, numbness, tingling, edema, sore throat, vision changes. Pt reports "slight" pain and pruritus to area.       CURRENT MEDICATIONS  Current Outpatient Medications on File Prior to Visit   Medication Sig Dispense Refill    AIMOVIG AUTOINJECTOR 140 mg/mL autoinjector       benzocaine-menthoL (CHLORASEPTIC MAX) 15-10 mg Lozg 1 lozenge by Mucous Membrane route every 2 (two) hours as needed (Sore throat). 30 lozenge 0    brimonidine-timoloL (COMBIGAN) 0.2-0.5 % Drop Place 1 drop into both eyes nightly.      butalbital-acetaminophen-caffeine -40 mg (FIORICET, ESGIC) -40 mg per tablet Take 1 tablet by mouth every 6 (six) hours as needed for Headaches. 15 tablet 0    butalbital-aspirin-caffeine -40 mg (FIORINAL) -40 mg Cap Take 1 capsule by mouth.      butalbital-aspirin-caffeine -40 mg (FIORINAL) -40 mg Cap Take 1 capsule by mouth daily as needed.      COMBIGAN 0.2-0.5 % Drop Place 1 drop into both eyes 2 (two) times daily.      diphenhydrAMINE (SOMINEX) 25 mg tablet Take 25 mg by mouth nightly as needed for Insomnia.      diphenoxylate-atropine 2.5-0.025 mg (LOMOTIL) 2.5-0.025 mg per tablet Lomotil 2.5 mg-0.025 mg tablet   Take 1 tablet twice a day by oral route as needed.      gabapentin (NEURONTIN) 300 MG capsule       gabapentin (NEURONTIN) 600 MG tablet Take 600 mg by mouth.      galcanezumab-gnlm (EMGALITY SYRINGE) 120 mg/mL Syrg Inject 120 mg into the skin every 28 days.      HYDROcodone-acetaminophen (NORCO) 5-325 mg per tablet Take 1 tablet by mouth every 6 (six) hours as needed for Pain. 30 tablet 0    ketorolac (TORADOL) 10 mg tablet TAKE 1 TABLET(10 MG) BY MOUTH EVERY 6 HOURS FOR 5 DAYS 20 " tablet 0    latanoprostene bunod (VYZULTA) 0.024 % Drop 1 drop.      levocetirizine (XYZAL) 5 MG tablet TAKE 1 TABLET(5 MG) BY MOUTH EVERY EVENING 90 tablet 0    methocarbamoL (ROBAXIN) 750 MG Tab Take 750 mg by mouth.      montelukast (SINGULAIR) 10 mg tablet Take 1 tablet (10 mg total) by mouth once daily. 90 tablet 3    naproxen sodium (ANAPROX) 220 MG tablet Take 220 mg by mouth 2 (two) times daily with meals.      NURTEC 75 mg odt Take 1 tablet by mouth once a day as needed for migraine 16 tablet 2    omeprazole (PRILOSEC) 40 MG capsule Take 1 capsule (40 mg total) by mouth every morning. 90 capsule 3    promethazine (PHENERGAN) 25 MG tablet TAKE 1/2 TO 1 TABLET BY MOUTH EVERY 6 HOURS AS NEEDED FOR SEVERE NAUSEA 15 tablet 1    SUMAtriptan (IMITREX) 20 mg/actuation nasal spray SMARTSIG:Spray(s) Both Nares      triamcinolone acetonide 0.025% (KENALOG) 0.025 % cream triamcinolone acetonide 0.025 % topical cream   Apply 1 application twice a day by topical route.      zolpidem (AMBIEN CR) 12.5 MG CR tablet TAKE 1 TABLET(12.5 MG) BY MOUTH EVERY NIGHT 30 tablet 0    valACYclovir (VALTREX) 1000 MG tablet Take 1 tablet (1,000 mg total) by mouth 3 (three) times daily. for 7 days 21 tablet 0     No current facility-administered medications on file prior to visit.       ALLERGIES  Review of patient's allergies indicates:   Allergen Reactions    Influenza a (h1n1) vac 09 (pf) Swelling    Latex Other (See Comments)    Latex, natural rubber      Redness        Immunization History   Administered Date(s) Administered    COVID-19 MRNA, LN-S PF (MODERNA HALF 0.25 ML DOSE) 12/30/2021    COVID-19, MRNA, LN-S, PF (MODERNA FULL 0.5 ML DOSE) 03/12/2021, 04/09/2021       PAST MEDICAL HISTORY  Past Medical History:   Diagnosis Date    ADHD (attention deficit hyperactivity disorder)     Allergy     Cancer     skin    Preglaucoma        SURGICAL HISTORY  Past Surgical History:   Procedure Laterality Date    COLONOSCOPY      SKIN CANCER  EXCISION         SOCIAL HISTORY  Social History     Socioeconomic History    Marital status:    Tobacco Use    Smoking status: Never    Smokeless tobacco: Never   Substance and Sexual Activity    Alcohol use: Never    Drug use: Never       FAMILY HISTORY  Family History   Problem Relation Name Age of Onset    Heart disease Mother      Hypertension Mother      Heart disease Father      Hypertension Father      Parkinsonism Maternal Grandfather      Alzheimer's disease Paternal Grandmother      Heart disease Paternal Grandfather          CHF       REVIEW OF SYSTEMS  Constitutional: No fever, chills, or weakness.  Eyes: No redness, pain, or discharge  HENT: No ear pain, no headache, no rhinorrhea, no throat pain  Respiratory: No cough, wheezing or shortness of breath  Cardiovascular: No chest pain, palpitations or edema  Musculoskeletal: No pain, full range of motion. Good sensation  Skin: rash to right cheek  Neurologic: No focal weakness or sensory changes.  All systems otherwise negative except as noted in the Review of Systems and History of Present Illness      PHYSICAL EXAM  Reviewed Triage Note  VITAL SIGNS: 127/83  Constitutional: Well developed, well nourished, Alert and oriented x3, No acute distress, non-toxic appearance.  HENT: Normocephalic, Bilateral external ears normal, external nose negative, oropharynx moist, No oral exudates. - see integumentary   Eyes: PERRL, EOMI, Conjunctiva normal, No discharge.  Neck: Normal range of motion, no tenderness, supple, no carotid bruits  Respiratory: Normal breath sounds, no respiratory distress, no wheezing, no rhonchi, no rales  Cardiovascular: HR 73, normal rhythm, no murmurs, no rubs, no gallops.  Integument: raised, erythematous papules with 2 vesicles scattered to upper right cheek below right eye, no ocular involvement.   Neurologic: Normal motor function, normal sensory function. No focal deficits noted. Intact distal pulses  Psychiatric: Affect  normal, judgment normal, mood normal        MEDICAL DECISION MAKING    Physical exam findings discussed with patient. No acute emergent medical condition identified at this time to warrant further testing. Will dispo home with instructions to follow up with PCP tomorrow. Script for acyclovir, prednisone and neurontin sent to pharmacy of choice with instructions on usage. Pt agrees with plan of care.     DISPOSITION  Patient discharged in stable condition     CLINICAL IMPRESSION:  The encounter diagnosis was Herpes zoster without complication.    Patient advised to follow-up with your PCP within 3 days for BP re-check if Blood Pressure was >120/80 without history of hypertension.

## 2024-05-24 NOTE — PROGRESS NOTES
Subjective:      Patient ID: Jason Swartz is a 58 y.o. male.    Vitals:  height is 6' (1.829 m) and weight is 126.1 kg (278 lb). His temperature is 98.2 °F (36.8 °C). His blood pressure is 127/83 and his pulse is 73. His respiration is 18 and oxygen saturation is 96%.     Chief Complaint: Rash    Rash  This is a new problem. The current episode started in the past 7 days (x's 3 days). The problem has been gradually worsening since onset. The affected locations include the face (under R eye). The rash is characterized by blistering, redness and swelling.       Skin:  Positive for rash.      Objective:     Physical Exam    Assessment:     No diagnosis found.    Plan:       There are no diagnoses linked to this encounter.

## 2024-05-24 NOTE — LETTER
May 24, 2024      [facility] [place]  [address]  [address]  [place] [address]  Phone: [phone]  Fax: [phone]       Patient: Jason Swartz   YOB: 1965  Date of Visit: 05/24/2024    To Whom It May Concern:    Raven Swartz  was at [facility] on 05/24/2024. The patient may return to work/school on 5/28/2024 with no restrictions. If you have any questions or concerns, or if I can be of further assistance, please do not hesitate to contact me.    Sincerely,    ANGELI PadillaC

## 2024-08-26 PROBLEM — J01.90 ACUTE SINUSITIS: Status: RESOLVED | Noted: 2024-05-24 | Resolved: 2024-08-26

## 2025-03-20 ENCOUNTER — OFFICE VISIT (OUTPATIENT)
Dept: URGENT CARE | Facility: CLINIC | Age: 60
End: 2025-03-20
Payer: COMMERCIAL

## 2025-03-20 VITALS
HEIGHT: 72 IN | OXYGEN SATURATION: 96 % | SYSTOLIC BLOOD PRESSURE: 116 MMHG | WEIGHT: 278 LBS | DIASTOLIC BLOOD PRESSURE: 79 MMHG | RESPIRATION RATE: 18 BRPM | TEMPERATURE: 98 F | HEART RATE: 79 BPM | BODY MASS INDEX: 37.65 KG/M2

## 2025-03-20 DIAGNOSIS — G43.909 MIGRAINE WITHOUT STATUS MIGRAINOSUS, NOT INTRACTABLE, UNSPECIFIED MIGRAINE TYPE: Primary | ICD-10-CM

## 2025-03-20 DIAGNOSIS — K58.9 IRRITABLE BOWEL SYNDROME, UNSPECIFIED TYPE: ICD-10-CM

## 2025-03-20 PROBLEM — S90.569A: Status: ACTIVE | Noted: 2025-03-20

## 2025-03-20 PROBLEM — R53.83 FATIGUE: Status: ACTIVE | Noted: 2025-03-20

## 2025-03-20 PROBLEM — G47.33 OBSTRUCTIVE SLEEP APNEA SYNDROME: Status: ACTIVE | Noted: 2025-03-20

## 2025-03-20 PROBLEM — R53.83 MALAISE AND FATIGUE: Status: ACTIVE | Noted: 2025-03-20

## 2025-03-20 PROBLEM — K08.89 TOOTHACHE: Status: ACTIVE | Noted: 2025-03-20
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
## 2025-03-20 PROBLEM — H40.9 GLAUCOMA: Status: ACTIVE | Noted: 2024-12-11

## 2025-03-20 PROBLEM — K64.4 EXTERNAL HEMORRHOIDS: Status: ACTIVE | Noted: 2025-03-20

## 2025-03-20 PROBLEM — G44.209 TENSION-TYPE HEADACHE: Status: ACTIVE | Noted: 2025-03-20

## 2025-03-20 PROBLEM — L08.9: Status: ACTIVE | Noted: 2025-03-20

## 2025-03-20 PROBLEM — F32.9 MAJOR DEPRESSIVE DISORDER, SINGLE EPISODE, UNSPECIFIED: Status: ACTIVE | Noted: 2022-07-10

## 2025-03-20 PROBLEM — R53.81 MALAISE AND FATIGUE: Status: ACTIVE | Noted: 2025-03-20

## 2025-03-20 RX ORDER — KETOROLAC TROMETHAMINE 30 MG/ML
30 INJECTION, SOLUTION INTRAMUSCULAR; INTRAVENOUS
Status: COMPLETED | OUTPATIENT
Start: 2025-03-20 | End: 2025-03-20

## 2025-03-20 RX ORDER — DICYCLOMINE HYDROCHLORIDE 20 MG/1
20 TABLET ORAL EVERY 6 HOURS
Qty: 120 TABLET | Refills: 0 | Status: SHIPPED | OUTPATIENT
Start: 2025-03-20 | End: 2025-04-19

## 2025-03-20 RX ORDER — DICYCLOMINE HYDROCHLORIDE 20 MG/1
1 TABLET ORAL
COMMUNITY
Start: 2025-02-03

## 2025-03-20 RX ADMIN — KETOROLAC TROMETHAMINE 30 MG: 30 INJECTION, SOLUTION INTRAMUSCULAR; INTRAVENOUS at 05:03

## 2025-03-20 NOTE — PROGRESS NOTES
Subjective:      Patient ID: Jason Swartz is a 59 y.o. male.    Vitals:  height is 6' (1.829 m) and weight is 126.1 kg (278 lb). His temperature is 98.2 °F (36.8 °C). His blood pressure is 116/79 and his pulse is 79. His respiration is 18 and oxygen saturation is 96%.     Chief Complaint: Medication Refill  [age] year male presents to clinic for evaluation of migraine headache, skin tag, and prescription refill.  He reports migraine has been present for approximately 1 week.  He denies photophobia, phonophobia, nausea or vomiting.  He denies fever or chills.  He is requesting prescription refill for Bentyl which he takes for IBS.  He also has concerns for a skin tag from his right scrotum which fell off earlier today.  Bentyl, requesting shot for migraine. Pt also states he had a possible skin tag that fell off of his penis and it now has a skin tear.     Medication Refill  This is a new problem. Associated symptoms include headaches.   Migraine   This is a new problem. The current episode started 1 to 4 weeks ago (x 1 week). The problem has been gradually worsening. The pain is at a severity of 5/10.       Constitution: Negative.   HENT: Negative.     Neck: neck negative.   Eyes: Negative.    Respiratory: Negative.     Gastrointestinal: Negative.    Musculoskeletal: Negative.    Skin:  Positive for wound. Negative for erythema.   Neurological:  Positive for headaches.   Hematologic/Lymphatic: Negative.    Psychiatric/Behavioral: Negative.        Objective:     Physical Exam   Constitutional: He is oriented to person, place, and time.  Non-toxic appearance. He does not appear ill. No distress. normal  HENT:   Head: Normocephalic.   Ears:   Right Ear: Tympanic membrane normal.   Left Ear: Tympanic membrane normal.   Nose: Nose normal. No rhinorrhea or congestion.   Mouth/Throat: Mucous membranes are moist. No oropharyngeal exudate or posterior oropharyngeal erythema. Oropharynx is clear.   Eyes:  Pupils are equal, round, and reactive to light.   Cardiovascular: Normal rate, regular rhythm, normal heart sounds and normal pulses.   No murmur heard.  Pulmonary/Chest: Effort normal and breath sounds normal. No respiratory distress. He has no wheezes. He has no rales.   Abdominal: Normal appearance. Soft. There is no abdominal tenderness.   Genitourinary:         Musculoskeletal: Normal range of motion.         General: Normal range of motion.   Neurological: no focal deficit. He is alert and oriented to person, place, and time.   Skin: Skin is warm, dry, not pale and no rash. Capillary refill takes less than 2 seconds. No erythema   Psychiatric: His behavior is normal. Mood, judgment and thought content normal.   Nursing note and vitals reviewed.      Assessment:     1. Migraine without status migrainosus, not intractable, unspecified migraine type    2. Irritable bowel syndrome, unspecified type        Plan:       Migraine without status migrainosus, not intractable, unspecified migraine type  -     ketorolac injection 30 mg  -     dicyclomine (BENTYL) 20 mg tablet; Take 1 tablet (20 mg total) by mouth every 6 (six) hours.  Dispense: 120 tablet; Refill: 0    Irritable bowel syndrome, unspecified type  -     dicyclomine (BENTYL) 20 mg tablet; Take 1 tablet (20 mg total) by mouth every 6 (six) hours.  Dispense: 120 tablet; Refill: 0